# Patient Record
Sex: FEMALE | Race: WHITE | NOT HISPANIC OR LATINO
[De-identification: names, ages, dates, MRNs, and addresses within clinical notes are randomized per-mention and may not be internally consistent; named-entity substitution may affect disease eponyms.]

---

## 2021-02-02 ENCOUNTER — RESULT REVIEW (OUTPATIENT)
Age: 61
End: 2021-02-02

## 2021-02-24 ENCOUNTER — TRANSCRIPTION ENCOUNTER (OUTPATIENT)
Age: 61
End: 2021-02-24

## 2021-02-24 ENCOUNTER — APPOINTMENT (OUTPATIENT)
Dept: HEMATOLOGY ONCOLOGY | Facility: CLINIC | Age: 61
End: 2021-02-24

## 2021-02-24 PROBLEM — Z00.00 ENCOUNTER FOR PREVENTIVE HEALTH EXAMINATION: Status: ACTIVE | Noted: 2021-02-24

## 2021-03-03 ENCOUNTER — NON-APPOINTMENT (OUTPATIENT)
Age: 61
End: 2021-03-03

## 2021-04-13 ENCOUNTER — APPOINTMENT (OUTPATIENT)
Dept: PLASTIC SURGERY | Facility: CLINIC | Age: 61
End: 2021-04-13
Payer: SELF-PAY

## 2021-04-13 VITALS
RESPIRATION RATE: 20 BRPM | OXYGEN SATURATION: 97 % | HEIGHT: 65 IN | SYSTOLIC BLOOD PRESSURE: 140 MMHG | HEART RATE: 90 BPM | DIASTOLIC BLOOD PRESSURE: 92 MMHG | WEIGHT: 179 LBS | BODY MASS INDEX: 29.82 KG/M2

## 2021-04-13 DIAGNOSIS — Z98.86 PERSONAL HISTORY OF BREAST IMPLANT REMOVAL: ICD-10-CM

## 2021-04-13 DIAGNOSIS — Z98.890 OTHER SPECIFIED POSTPROCEDURAL STATES: ICD-10-CM

## 2021-04-13 PROCEDURE — 99202 OFFICE O/P NEW SF 15 MIN: CPT

## 2021-04-21 NOTE — HISTORY OF PRESENT ILLNESS
[FreeTextEntry1] : Ms. RAMYA BAR is a61 year White woman complaining of the aged appearance of her upper eyelids.  She has skin laxity on the upper eyelids, along with puffiness that is prominent and heavy.   There is some skin excess and we discussed a scar along the natural skin supra tarsal fold crease above the eye in the upper eyelid .  The patient is a good candidate for upper blepharoplasty surgery and will benefit from the procedure in the office with local anesthetic. All questions were answered and the risks, benefits, alternatives, limitations, and permanent scars were outlined with her.\par

## 2021-05-21 ENCOUNTER — APPOINTMENT (OUTPATIENT)
Dept: PLASTIC SURGERY | Facility: CLINIC | Age: 61
End: 2021-05-21
Payer: SELF-PAY

## 2021-05-21 VITALS
DIASTOLIC BLOOD PRESSURE: 83 MMHG | HEART RATE: 66 BPM | OXYGEN SATURATION: 98 % | SYSTOLIC BLOOD PRESSURE: 122 MMHG | RESPIRATION RATE: 18 BRPM

## 2021-05-21 VITALS
HEART RATE: 75 BPM | SYSTOLIC BLOOD PRESSURE: 118 MMHG | TEMPERATURE: 97.8 F | RESPIRATION RATE: 18 BRPM | OXYGEN SATURATION: 95 % | DIASTOLIC BLOOD PRESSURE: 77 MMHG

## 2021-05-21 VITALS
HEART RATE: 80 BPM | OXYGEN SATURATION: 95 % | DIASTOLIC BLOOD PRESSURE: 77 MMHG | SYSTOLIC BLOOD PRESSURE: 125 MMHG | RESPIRATION RATE: 16 BRPM

## 2021-05-21 PROCEDURE — 15822 BLEPHAROPLASTY UPPER EYELID: CPT | Mod: 50

## 2021-05-21 NOTE — PROCEDURE
[Nl] : None [FreeTextEntry1] : upper blepharochalasis bilateral [FreeTextEntry2] : upper blepharoplasty bilateral [FreeTextEntry3] : 8 cc 1% lido 1:100 k epi  [FreeTextEntry4] : <1cc  [FreeTextEntry6] :  RAMYA BAR  is complaining of upper eyelid blepharochalasis.   The risks, benefits, alternatives limitations and the permanent scars were outlined with the patient and  she she is prepared for an upper blepharoplasty and has been compliant with the pre-operative instructions.  \par \par Procedure:  Under aseptic conditions with local anesthetic and oral sedation with 5 mg valium and percocet 5/325 x 1  and po cephalexin 500 mg ,  the upper eyelid incision lines were delineated with marks on the supratarsal crease and at the upper limit of redundant eyelid skin  accounting for brow position.  Local anesthetic was infiltrated into the subcutaneous plane  and after ten minutes elapsed time the incision was made and the skin ellipse was excised.  A strip of pre-septal orbicularis oculi was resected and the orbital septum was entered.   Conservative resection of the middle and medial fat pads was accomplished and the stumps of the fat pads were cauterized.  once hemostasis was assured with electrocautery, the wounds were reapproximated with running 6-0 nylon sutures\par

## 2021-05-21 NOTE — ASSESSMENT
[FreeTextEntry1] : RAMYA tolerated the procedure well. RAMYA  has had uncomplicated recovery from surgery and anesthesia The instructions were reviewed in detail with RAMYA. RAMYA will return to the office for a post procedure visit  tuesday for suture removal

## 2021-05-25 ENCOUNTER — APPOINTMENT (OUTPATIENT)
Dept: PLASTIC SURGERY | Facility: CLINIC | Age: 61
End: 2021-05-25
Payer: SELF-PAY

## 2021-05-25 VITALS
SYSTOLIC BLOOD PRESSURE: 134 MMHG | RESPIRATION RATE: 18 BRPM | DIASTOLIC BLOOD PRESSURE: 84 MMHG | OXYGEN SATURATION: 95 % | TEMPERATURE: 98.4 F | HEART RATE: 83 BPM

## 2021-05-25 PROCEDURE — 99024 POSTOP FOLLOW-UP VISIT: CPT

## 2021-05-25 RX ORDER — PANTOPRAZOLE 40 MG/1
40 TABLET, DELAYED RELEASE ORAL
Qty: 90 | Refills: 0 | Status: ACTIVE | COMMUNITY
Start: 2021-02-27

## 2021-05-25 RX ORDER — ATORVASTATIN CALCIUM 40 MG/1
40 TABLET, FILM COATED ORAL
Qty: 90 | Refills: 0 | Status: ACTIVE | COMMUNITY
Start: 2020-12-15

## 2021-05-25 NOTE — ASSESSMENT
[FreeTextEntry1] : pt looks great and is pleased with her appearance . RAMYA  has had uncomplicated recovery from surgery and anesthesia The instructions were reviewed in detail with RAMYA. RAMYA will return to the office for a post procedure visit 3 months for check

## 2021-05-25 NOTE — HISTORY OF PRESENT ILLNESS
[FreeTextEntry1] : 4 d s/p upper bleph surgery well healed  All of RAMYA's incisions are clean dry and healing well.  Her  sutures were removed and areas steri stripped. satish eomi modest swelling and bruising RAMYA  has had uncomplicated recovery from surgery and anesthesia The patient denies fever,chills, shortness of breath, chest pain, calf pain

## 2021-06-24 ENCOUNTER — APPOINTMENT (OUTPATIENT)
Dept: PLASTIC SURGERY | Facility: CLINIC | Age: 61
End: 2021-06-24
Payer: SELF-PAY

## 2021-06-24 VITALS
HEART RATE: 74 BPM | DIASTOLIC BLOOD PRESSURE: 88 MMHG | RESPIRATION RATE: 18 BRPM | TEMPERATURE: 98.7 F | OXYGEN SATURATION: 97 % | SYSTOLIC BLOOD PRESSURE: 128 MMHG

## 2021-06-24 DIAGNOSIS — H02.34 BLEPHAROCHALASIS RIGHT UPPER EYELID: ICD-10-CM

## 2021-06-24 DIAGNOSIS — H02.31 BLEPHAROCHALASIS RIGHT UPPER EYELID: ICD-10-CM

## 2021-06-24 PROCEDURE — 99024 POSTOP FOLLOW-UP VISIT: CPT

## 2021-06-25 PROBLEM — H02.31 BLEPHAROCHALASIS OF BOTH UPPER EYELIDS: Status: ACTIVE | Noted: 2021-04-21

## 2021-06-25 NOTE — HISTORY OF PRESENT ILLNESS
[FreeTextEntry1] : pt is very happy after upper bleph surgery  she is ready to have rhinoplasty and lower bleph surgery  The risks, benefits, alternatives, limitations and the permanent scars were outlined with the patient. All of RAMYA 's questions were answered completely

## 2021-06-25 NOTE — ASSESSMENT
[FreeTextEntry1] : pt will schedule lower bleph and rhinoplasty surgery at her discretion Ms. RAMYA BAR is a61 year White woman complaining of the aged appearance of her lower eyelids. On the lower eyelids, there is some puffiness, along with a tear trough deformity below her bags.  The canthus is mildly lax and will need to be tightened.  There is some skin excess and we discussed a scar just below the lash line on the lower eyelids. We also discussed redraping of the fat in the lower eyelids and canthal support with suture tightening.  The patient is a good candidate for the proposed surgery and will benefit from the procedure. All questions were answered and the risks, benefits, alternatives, limitations, and permanent scars were outlined with her. RAMYA BAR is a 61 year Non- or   female complaining of the appearance of her   nose. she  desires nasal cosmetic surgery. The risks, benefits, alternatives, limitations, and the permanent scars were outlined with the patient for a cosmetic open rhinoplasty. We will address the dorsal bump,  the reduction of nasal width , the nasal tip  reduction and sculpting,  and the nasolabial angle and make adjustments based on realistic desires and principles for rhinoplasty.   All questions were answered.  This patient is an acceptable candidate for the proposed surgery\par

## 2021-10-26 DIAGNOSIS — Z84.1 FAMILY HISTORY OF DISORDERS OF KIDNEY AND URETER: ICD-10-CM

## 2021-10-26 DIAGNOSIS — Z86.19 PERSONAL HISTORY OF OTHER INFECTIOUS AND PARASITIC DISEASES: ICD-10-CM

## 2021-10-26 DIAGNOSIS — Z82.61 FAMILY HISTORY OF ARTHRITIS: ICD-10-CM

## 2021-10-26 DIAGNOSIS — Z80.9 FAMILY HISTORY OF MALIGNANT NEOPLASM, UNSPECIFIED: ICD-10-CM

## 2021-10-26 DIAGNOSIS — Z83.49 FAMILY HISTORY OF OTHER ENDOCRINE, NUTRITIONAL AND METABOLIC DISEASES: ICD-10-CM

## 2021-10-26 DIAGNOSIS — Z86.69 PERSONAL HISTORY OF OTHER DISEASES OF THE NERVOUS SYSTEM AND SENSE ORGANS: ICD-10-CM

## 2021-10-26 DIAGNOSIS — Z78.0 ASYMPTOMATIC MENOPAUSAL STATE: ICD-10-CM

## 2021-10-26 DIAGNOSIS — K21.9 GASTRO-ESOPHAGEAL REFLUX DISEASE W/OUT ESOPHAGITIS: ICD-10-CM

## 2021-10-26 DIAGNOSIS — Z82.49 FAMILY HISTORY OF ISCHEMIC HEART DISEASE AND OTHER DISEASES OF THE CIRCULATORY SYSTEM: ICD-10-CM

## 2021-10-26 DIAGNOSIS — Z83.3 FAMILY HISTORY OF DIABETES MELLITUS: ICD-10-CM

## 2021-10-26 DIAGNOSIS — Z83.511 FAMILY HISTORY OF GLAUCOMA: ICD-10-CM

## 2021-10-27 ENCOUNTER — APPOINTMENT (OUTPATIENT)
Dept: HEMATOLOGY ONCOLOGY | Facility: CLINIC | Age: 61
End: 2021-10-27
Payer: COMMERCIAL

## 2021-10-27 VITALS
TEMPERATURE: 98.6 F | HEIGHT: 65 IN | OXYGEN SATURATION: 98 % | HEART RATE: 83 BPM | RESPIRATION RATE: 18 BRPM | BODY MASS INDEX: 29.99 KG/M2 | DIASTOLIC BLOOD PRESSURE: 73 MMHG | SYSTOLIC BLOOD PRESSURE: 141 MMHG | WEIGHT: 180 LBS

## 2021-10-27 DIAGNOSIS — Z80.3 FAMILY HISTORY OF MALIGNANT NEOPLASM OF BREAST: ICD-10-CM

## 2021-10-27 PROCEDURE — 99205 OFFICE O/P NEW HI 60 MIN: CPT

## 2021-10-27 RX ORDER — CEPHALEXIN 500 MG/1
500 CAPSULE ORAL
Qty: 20 | Refills: 0 | Status: DISCONTINUED | COMMUNITY
Start: 2021-05-18 | End: 2021-10-27

## 2021-10-27 RX ORDER — FUROSEMIDE 20 MG/1
20 TABLET ORAL
Refills: 0 | Status: ACTIVE | COMMUNITY

## 2021-10-27 RX ORDER — CEPHALEXIN 500 MG/1
500 TABLET ORAL
Qty: 20 | Refills: 0 | Status: DISCONTINUED | COMMUNITY
Start: 2021-05-18 | End: 2021-10-27

## 2021-10-27 RX ORDER — PREDNISOLONE ACETATE 10 MG/ML
1 SUSPENSION/ DROPS OPHTHALMIC
Qty: 5 | Refills: 0 | Status: DISCONTINUED | COMMUNITY
Start: 2020-12-11 | End: 2021-10-27

## 2021-11-01 LAB
25(OH)D3 SERPL-MCNC: 28.2 NG/ML
ALBUMIN MFR SERPL ELPH: 60.5 %
ALBUMIN SERPL-MCNC: 4.6 G/DL
ALBUMIN/GLOB SERPL: 1.5 RATIO
ALPHA1 GLOB MFR SERPL ELPH: 4.7 %
ALPHA1 GLOB SERPL ELPH-MCNC: 0.4 G/DL
ALPHA2 GLOB MFR SERPL ELPH: 12.8 %
ALPHA2 GLOB SERPL ELPH-MCNC: 1 G/DL
B-GLOBULIN MFR SERPL ELPH: 11.4 %
B-GLOBULIN SERPL ELPH-MCNC: 0.9 G/DL
DEPRECATED KAPPA LC FREE/LAMBDA SER: 1.26 RATIO
DEPRECATED KAPPA LC FREE/LAMBDA SER: 1.26 RATIO
FOLATE SERPL-MCNC: 5.9 NG/ML
GAMMA GLOB FLD ELPH-MCNC: 0.8 G/DL
GAMMA GLOB MFR SERPL ELPH: 10.6 %
IGA SER QL IEP: 161 MG/DL
IGG SER QL IEP: 816 MG/DL
IGM SER QL IEP: 89 MG/DL
INTERPRETATION SERPL IEP-IMP: NORMAL
KAPPA LC CSF-MCNC: 1.09 MG/DL
KAPPA LC CSF-MCNC: 1.09 MG/DL
KAPPA LC SERPL-MCNC: 1.37 MG/DL
KAPPA LC SERPL-MCNC: 1.37 MG/DL
M PROTEIN SPEC IFE-MCNC: NORMAL
PROT SERPL-MCNC: 7.6 G/DL
PROT SERPL-MCNC: 7.6 G/DL
VIT B12 SERPL-MCNC: 1625 PG/ML

## 2021-11-01 NOTE — HISTORY OF PRESENT ILLNESS
[0 - No Distress] : Distress Level: 0 [de-identified] : This is Cesar is a 61-year-old female who is referred by Dr. Joe Weaver for initial consultation for hematologic evaluation of elevated EBV VCA IgG. \par She was following up with Dr. Weaver for routine visit and was found to be extremely fatigued.  Labs were drawn which revealed an EBV VCA IgG of 750.  She reports having recent infections with herpes zoster, diverticulitis and now EBV recurrence.  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  It was stopped approximately 2 years ago.  She also had an incidence of abscesses that were thought to be autoimmune related while in her 20s.\par RA on Methotrexate for 15 years stopped two years ago. in her 20s had abscess prob autoimmune

## 2021-11-01 NOTE — ASSESSMENT
[FreeTextEntry1] : This appears to be a relatively acute process.\par I will obtain a CBC with differential and a peripheral blood smear review.\par I will obtain a complete hematological workup to include a CMP, LDH, haptoglobin, reticulocyte count, B12/MMA/folic acid levels, TSH, SPEP, SIEP, IgQ,serum free light chains, JAYCEE,  RF, panel, ferritin level, and a Marleni' test.\par At this time she offers no evidence of B symptoms.\par I will obtain abdominal ultrasound to evaluate for any evidence or organomegaly.\par She may require an infectious disease consultation depending on the above w/u.\par The patient will return in 2-3 weeks for followup, review of the above workup, and further recommendations.\par \par Thank you very much for allowing me to participate in the care of this patient. Should you have any questions or concerns please do not hesitate to call me directly.

## 2021-11-12 LAB — METHYLMALONATE SERPL-SCNC: 131 NMOL/L

## 2021-11-15 ENCOUNTER — APPOINTMENT (OUTPATIENT)
Dept: HEMATOLOGY ONCOLOGY | Facility: CLINIC | Age: 61
End: 2021-11-15
Payer: COMMERCIAL

## 2021-11-15 VITALS
HEART RATE: 70 BPM | HEIGHT: 65 IN | TEMPERATURE: 97.7 F | DIASTOLIC BLOOD PRESSURE: 88 MMHG | OXYGEN SATURATION: 98 % | WEIGHT: 180 LBS | BODY MASS INDEX: 29.99 KG/M2 | SYSTOLIC BLOOD PRESSURE: 141 MMHG | RESPIRATION RATE: 18 BRPM

## 2021-11-15 PROCEDURE — 99214 OFFICE O/P EST MOD 30 MIN: CPT

## 2021-12-13 NOTE — DISCUSSION/SUMMARY
[FreeTextEntry1] : CANCER GENETICS CONSULT NOTE – INITIAL VISIT\par \par REASON FOR VISIT:\par Ms. Mechelle Guerrero is a 60-year-old female referred by Dr. Jaskaran Horton for cancer genetic counseling and risk assessment due to a family history of cancer.   \par \par HISTORY OF PRESENT ILLNESS:\par Ms. Guerrero has had no cancer diagnoses other than a basal cell carcinoma removed from her face at age 54.  \par \par RELEVANT MEDICAL AND SURGICAL HISTORY:\par The patient reports a personal history of hypertension and auto-immune disease. She has dense breast and has had one benign breast biopsy.  She had an uterine ablation due to heavy bleeding in her late 40s.  \par \par PAST OB/GYN HISTORY:\par Obstetrical History: \par Age at Menarche: 8\par Menopausal Status:  Post-menopausal.  Menopause was at age early-mid 50s\par Age at First Live Birth: 28\par Oral Contraceptive Use: Yes, for 1 year late 30s \par Hormone Replacement Therapy: No\par Ovaries and uterus remain intact: Yes\par \par CANCER SCREENING HISTORY:  \par Breast: Annual mammogram and annual breast ultrasound \par GYN: Annual GYN visits \par Gastrointestinal: Screening colonoscopy began at age 49, she reports having 2-4 polyps removed\par Skin: Annual skin examinations as needed \par \par SOCIAL HISTORY:\par Ms. Guerrero is a retired .  \par \par FAMILY HISTORY:\par Ms. Guerrero   is of maternal and paternal [place] ancestry.  A detailed family history of cancer was ascertained.  Please see attached pedigree.  \par \par RISK ASSESSMENT:\par Ms. Guerrero has a brother diagnosed with prostate cancer, her mom with renal cancer, her maternal grandfather with colon maternal grandmother with bone and a maternal great aunt with breast cancer.   \par \par The paternal family history presents with breast cancer in a first cousin, esophageal cancer in a first cousin, prostate cancer in paternal uncle and leukemia in a first cousin once removed.   \par \par GENETIC TESTING:  \par It was recommended that Ms. Guerrero   consider germline genetic testing for a hereditary cancer panel of 20 genes including:  BENTLEY, BARD1, BRCA1, BRCA2, BRIP1, CDH1, CHEK2, EPCAM, MLH1, MSH2, MSH6, NBN, NF1, PALB2, PMS2, PTEN, RAD51C, RAD51D, STK11, and TP53 \par \par The risks, benefits and limitations of genetic testing were discussed with Ms. Guerrero.  Possible test results were reviewed with the patient along with associated medical management options.  Genetic implications for family members were reviewed as well as psychosocial factors associated with genetic testing.  Insurance coverage and out of pocket costs were also discussed.  Information regarding the Genetic Information Non-discrimination Act (LOLIS) and potential for genetic discrimination was reviewed.    \par \par Ms. Guerrero consented to pursue genetic testing.  A blood sample was drawn on 2021 and sent to [facility] for analysis.            \par             \par \par PLAN:\par We will contact Ms. Guerrero   and Dr. Horton when we receive the genetic test results.    \par \par We remain available to Ms. Guerrero for questions, comments, or concerns.     \par \par For additional questions please call Cancer Genetics at [phone]. \par \par \par Yulissa Hernandez, MS, CGC, DrPH\par Sr. Genetic Counselor \par [facility] [place] \par [facility]\par \par cc:  \par Dr. Jaskaran Horton\par Mechelle Mujicadon\par \par Enclosure/Addendum:\par Pedigree\par

## 2021-12-13 NOTE — DISCUSSION/SUMMARY
[FreeTextEntry1] : CANCER GENETICS CONSULT NOTE – RESULTS DISCLOSURE \par \par REASON FOR CONSULT:\par Ms. Mechelle Guerrero is a 60-year-old female referred by Dr. Jaskaran Horton for cancer genetic counseling and risk assessment due to a family history of cancer.   \par \par Ms. Guerrero   received cancer genetic counseling on 2021 where a personal and family history of cancer was obtained, and germline genetic testing was ordered.  Ms. Guerrero was informed of her genetic test results over the telephone on 3/3/2021.    \par \par TEST RESULTS:\par Ms. Guerrero   consented to receive genetic testing for a Hereditary Cancer Panel including 20 genes and a specimen was drawn on 2021 and sent to [facility] for analysis.  The results report is dated 2021.     \par \par RESULTS:  No pathogenic variants were identified in the following 20 genes tested:  \par \par One variant of uncertain clinical significance (VUS) was identified in the patient’s MSH6 gene, specifically:  MSH6	c.2146A>G\par   \par ASSESSMENT AND PLAN:\par Given Ms. Guerrero’s genetic test results, her personal history and cancer family history it is unlikely that she has a hereditary cancer susceptibility syndrome.  \par \par Implications for the patient:\par In light of these genetic test results and the patient’s personal and family history it was recommended that she continue to follow the cancer screening guidelines recommended by her healthcare team.  \par \par A variant of uncertain significance (VUS) was detected in the patient’s MSH6 gene.  At this time the available evidence is insufficient to determine the role of this VUS in disease and the clinical significance of this result is uncertain. \par  \par The detection of this VUS does NOT currently change the patient’s medical management. It is NOT recommended at this time that family members use this VUS result for predictive genetic testing or medical management decisions. With more research, a VUS may be reclassified as either disease-causing or benign. The patient was encouraged to contact us every 2-3 years to inquire about any new information for this variant.   \par \par It was discussed that although these genetic test results are reassuring, they do not entirely rule out the possibility of hereditary cancer risk.  Ms. Guerrero’s results do not exclude the possibility of an undetected mutation in the genes tested and do not exclude the possibility of a mutation in either known or unknown cancer susceptibility genes not tested.  \par \par Ms. Guerrero was informed that our knowledge of genetics and inherited cancer conditions is changing rapidly. Testing guidelines, classification and interpretation of genetic test results, and techniques for cancer prevention and screening are constantly being reviewed, updated, and improved. Therefore, it was recommended that Ms. Guerrero    contact our office, every 2 to 3 years, to discuss relevant advances in cancer genetics.  The importance of re-contacting us with updates regarding her personal and family history as well as updates regarding additional cancer genetic test results performed for her and/or family members was emphasized.  Such updates could possibly change our risk assessment and recommendations. \par \par Implications for family members:\par It was recommended that Ms. Guerrero’s family members with cancer consider cancer genetic counseling given that Ms. Guerrero’s negative test results do not exclude them from having a cancer susceptibility genetic variant.  If these individuals are , then their first degree relative may consider genetic counseling with possible testing.  \par \par It is unlikely that Ms. Guerrero’s children would benefit from cancer genetic testing at this time given their mother’s negative genetic test results, unless of course their father has a significant personal or family history of cancer.   \par \par FOLLOW UP:\par •	Ms. Guerrero to contact us with updates regarding her personal and family history and to check-in with us every 2-3 years.  \par •	We would be happy to see Ms. Guerrero’s family members for cancer genetic counseling.  If they do not live locally they may find genetic services through the National Society of Genetic Counselors (www.nsgc.org/findageneticcounselor) or the National Cancer [place] [facility] Gen[facility] Services Directory (www.cancer.gov/cancertopics/genetics/directory).  \par \par We remain available to Ms. Guerrero  for questions, comments, or concerns.  For additional questions please call Cancer Genetics at [phone]. \par \par Yulissa Hernandez, MS, CGC, DrPH\par Sr. Genetic Counselor, Cancer Genetics \par [facility] [place] \par [facility] \par \par cc:\par Dr. Jaskaran Horton\par Mechelle Cesar   \par

## 2022-01-31 ENCOUNTER — APPOINTMENT (OUTPATIENT)
Dept: HEMATOLOGY ONCOLOGY | Facility: CLINIC | Age: 62
End: 2022-01-31
Payer: COMMERCIAL

## 2022-01-31 VITALS
HEIGHT: 65 IN | TEMPERATURE: 98 F | OXYGEN SATURATION: 98 % | WEIGHT: 176 LBS | RESPIRATION RATE: 18 BRPM | HEART RATE: 82 BPM | DIASTOLIC BLOOD PRESSURE: 86 MMHG | BODY MASS INDEX: 29.32 KG/M2 | SYSTOLIC BLOOD PRESSURE: 145 MMHG

## 2022-01-31 DIAGNOSIS — K59.00 CONSTIPATION, UNSPECIFIED: ICD-10-CM

## 2022-01-31 PROCEDURE — 99214 OFFICE O/P EST MOD 30 MIN: CPT

## 2022-01-31 RX ORDER — CHROMIUM 200 MCG
1000 TABLET ORAL
Refills: 0 | Status: ACTIVE | COMMUNITY

## 2022-01-31 RX ORDER — OXYCODONE AND ACETAMINOPHEN 5; 325 MG/1; MG/1
5-325 TABLET ORAL
Qty: 20 | Refills: 0 | Status: DISCONTINUED | COMMUNITY
Start: 2021-05-18 | End: 2022-01-31

## 2022-02-01 PROBLEM — K59.00 CONSTIPATION: Status: ACTIVE | Noted: 2022-02-01

## 2022-02-01 NOTE — HISTORY OF PRESENT ILLNESS
[de-identified] : Mrs. Guerrero is a 61-year-old female who is referred by Dr. Joe Weaver for initial consultation for hematologic evaluation of elevated EBV VCA IgG. \par She was following up with Dr. Weaver for routine visit and was found to be extremely fatigued.  Labs were drawn which revealed an EBV VCA IgG of 750.  She reports having recent infections with herpes zoster, diverticulitis and now EBV recurrence.  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  It was stopped approximately 2 years ago.  She also had an incidence of abscesses that were thought to be autoimmune related while in her 20s.\par RA on Methotrexate for 15 years stopped two years ago. in her 20s had abscess prob autoimmune [de-identified] : She presents for follow up of hepatosplenomegaly and EBV. \par She has history of diverticulitis and has been having recent constipation followed by multple bms, diarrhea and abd pain. She  saw GI in October  who told her to go to liquid diet.

## 2022-02-01 NOTE — REVIEW OF SYSTEMS
[Abdominal Pain] : abdominal pain [Constipation] : constipation [Diarrhea] : diarrhea [FreeTextEntry2] : Significantly improved. [FreeTextEntry9] : Autoimmune arthritis

## 2022-02-01 NOTE — ASSESSMENT
[FreeTextEntry1] : This appears to be a relatively acute process.\par I obtained a CBC with differential and a peripheral blood smear review.\par I obtained a complete hematological workup to include a CMP, LDH, haptoglobin, reticulocyte count, B12/MMA/folic acid levels, TSH, SPEP, SIEP, IgQ,serum free light chains, JAYCEE,  RF, panel, ferritin level, and a Marleni' test.\par The work-up was largely unremarkable.  At this time she offers no evidence of B symptoms.  As a matter fact, she reports that she feels significantly better with improved energy levels and no further abdominal discomfort.\par I obtained abdominal ultrasound to evaluate for any evidence or organomegaly.  This showed mild hepatomegaly with steatosis as well as borderline splenomegaly.  She reports being aware of a prior diagnosis of hepatomegaly and given her overall improvement in symptoms I suspect that the borderline splenomegaly may be secondary to passive congestion.  I have recommended that we monitor this and repeat an abdominal ultrasound 4 to 6 months apart.  I am forwarding her ultrasound results to her gastroenterologist and she knows to follow-up with him.\par I have recommended should her symptoms relapse that she obtain an infectious disease consultation.  She knows that she can call my office for assistance in doing so should this happen.\par Reviewed available labs which were normal. \par We will give her prescription for a new ultrasound  to evaluate for hepatosplenomegaly seen on the last study\par Continue routine, age-appropriate, healthcare maintenance \par History of present illness, review of systems, physical exam and treatment plan reviewed with .\par Office visit in 12 weeks or prn for new or worsening symptoms. \par

## 2022-02-08 ENCOUNTER — RESULT REVIEW (OUTPATIENT)
Age: 62
End: 2022-02-08

## 2022-02-15 ENCOUNTER — RESULT REVIEW (OUTPATIENT)
Age: 62
End: 2022-02-15

## 2022-02-24 ENCOUNTER — NON-APPOINTMENT (OUTPATIENT)
Age: 62
End: 2022-02-24

## 2022-02-24 NOTE — DISCUSSION/SUMMARY
[FreeTextEntry1] : 2/24/2022\par \par Informed patient the MSH6 variant of uncertain significance (VUS) (c.2146A>G; [id number]) previously identified on her genetic testing through [facility] has been reclassified as likely benign. A new report was issued which will be released to the patient via [employer] portal and uploaded into her [facility] chart. Patient should continue screening recommendations discussed previously given her personal and family history.\par \par Parvin Nguyen MS, [facility] [place]par Genetic Counselor\par

## 2022-05-09 ENCOUNTER — APPOINTMENT (OUTPATIENT)
Dept: HEMATOLOGY ONCOLOGY | Facility: CLINIC | Age: 62
End: 2022-05-09
Payer: COMMERCIAL

## 2022-05-09 VITALS
BODY MASS INDEX: 30.16 KG/M2 | SYSTOLIC BLOOD PRESSURE: 131 MMHG | RESPIRATION RATE: 18 BRPM | TEMPERATURE: 98.5 F | HEART RATE: 74 BPM | WEIGHT: 181 LBS | DIASTOLIC BLOOD PRESSURE: 79 MMHG | HEIGHT: 65 IN | OXYGEN SATURATION: 98 %

## 2022-05-09 PROCEDURE — 99214 OFFICE O/P EST MOD 30 MIN: CPT

## 2022-05-09 NOTE — HISTORY OF PRESENT ILLNESS
[de-identified] : Mrs. Guerrero is a 62-year-old female who is referred by Dr. Joe Weaver for initial consultation for hematologic evaluation of elevated EBV VCA IgG. \par She was following up with Dr. Weaver for routine visit and was found to be extremely fatigued.  Labs were drawn which revealed an EBV VCA IgG of 750.  She reports having recent infections with herpes zoster, diverticulitis and now EBV recurrence.  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  It was stopped approximately 2 years ago.  She also had an incidence of abscesses that were thought to be autoimmune related while in her 20s.\par RA on Methotrexate for 15 years stopped two years ago. in her 20s had abscess prob autoimmune [de-identified] : She presents for follow up of hepatosplenomegaly and EBV. \par She has history of diverticulitis and has been having recent constipation followed by multple bms, diarrhea and abd pain. She  saw GI in October  who told her to go to liquid diet.  Has been better overall since last visit.

## 2022-05-09 NOTE — ASSESSMENT
[FreeTextEntry1] : This appears to be a relatively acute process.\par I obtained a CBC with differential and a peripheral blood smear review.\par I obtained a complete hematological workup to include a CMP, LDH, haptoglobin, reticulocyte count, B12/MMA/folic acid levels, TSH, SPEP, SIEP, IgQ,serum free light chains, JAYCEE,  RF, panel, ferritin level, and a Marleni' test.\par The work-up was largely unremarkable.  At this time she offers no evidence of B symptoms.  As a matter fact, she reports that she feels significantly better with improved energy levels and no further abdominal discomfort.\par I obtained abdominal ultrasound to evaluate for any evidence or organomegaly.  This showed mild hepatomegaly with steatosis as well as borderline splenomegaly.  She reports being aware of a prior diagnosis of hepatomegaly and given her overall improvement in symptoms I suspect that the borderline splenomegaly may be secondary to passive congestion.  I have recommended that we monitor this and repeat an abdominal ultrasound 4 to 6 months apart.  I am forwarding her ultrasound results to her gastroenterologist and she knows to follow-up with him.  A repeat ultrasound on 3/8/2022 was stable.\par I have recommended should her symptoms relapse that she obtain an infectious disease consultation.  She knows that she can call my office for assistance in doing so should this happen.\par Reviewed available labs which were normal. \par Continue routine, age-appropriate, healthcare maintenance \par Office visit in 12 weeks or prn for new or worsening symptoms. \par

## 2022-06-13 ENCOUNTER — APPOINTMENT (OUTPATIENT)
Dept: GERIATRICS | Facility: CLINIC | Age: 62
End: 2022-06-13
Payer: COMMERCIAL

## 2022-06-13 VITALS
DIASTOLIC BLOOD PRESSURE: 70 MMHG | SYSTOLIC BLOOD PRESSURE: 130 MMHG | BODY MASS INDEX: 30.95 KG/M2 | TEMPERATURE: 98 F | WEIGHT: 186 LBS | HEART RATE: 95 BPM | OXYGEN SATURATION: 97 %

## 2022-06-13 DIAGNOSIS — Z87.898 PERSONAL HISTORY OF OTHER SPECIFIED CONDITIONS: ICD-10-CM

## 2022-06-13 PROCEDURE — 99203 OFFICE O/P NEW LOW 30 MIN: CPT

## 2022-06-13 RX ORDER — DIAZEPAM 5 MG/1
5 TABLET ORAL
Qty: 1 | Refills: 0 | Status: COMPLETED | COMMUNITY
Start: 2021-05-18 | End: 2022-06-13

## 2022-06-13 RX ORDER — LOSARTAN POTASSIUM 50 MG/1
50 TABLET, FILM COATED ORAL
Qty: 90 | Refills: 0 | Status: COMPLETED | COMMUNITY
Start: 2020-12-16 | End: 2022-06-13

## 2022-06-13 NOTE — REVIEW OF SYSTEMS
[Chest Pain] : no chest pain [Palpitations] : no palpitations [Lower Ext Edema] : no lower extremity edema [Shortness Of Breath] : no shortness of breath [Dyspnea on Exertion] : no dyspnea on exertion [Joint Pain] : joint pain [Joint Stiffness] : joint stiffness [Insomnia] : insomnia [Anxiety] : anxiety [Negative] : Heme/Lymph

## 2022-06-13 NOTE — ASSESSMENT
[FreeTextEntry1] : Patient has been diagnosed with one or more medical conditions for which the use of medical marijuana is appropriate.   PDMP has been checked.  MM is being used to help with anxiety, pain, insomnia.  Patient has been using THC 5 mg chews without side effects can continue with this formulation. \par

## 2022-06-13 NOTE — PHYSICAL EXAM
[No Acute Distress] : no acute distress [Normal Sclera/Conjunctiva] : normal sclera/conjunctiva [EOMI] : extraocular movements intact [No JVD] : no jugular venous distention [No Lymphadenopathy] : no lymphadenopathy [Supple] : supple [No Respiratory Distress] : no respiratory distress  [No Accessory Muscle Use] : no accessory muscle use [Clear to Auscultation] : lungs were clear to auscultation bilaterally [Normal Rate] : normal rate  [Regular Rhythm] : with a regular rhythm [Normal S1, S2] : normal S1 and S2 [No Murmur] : no murmur heard [No Carotid Bruits] : no carotid bruits [No Abdominal Bruit] : a ~M bruit was not heard ~T in the abdomen [No Edema] : there was no peripheral edema [Soft] : abdomen soft [Non Tender] : non-tender [Non-distended] : non-distended [Normal Bowel Sounds] : normal bowel sounds [Grossly Normal Strength/Tone] : grossly normal strength/tone [No Rash] : no rash [Normal Gait] : normal gait [Alert and Oriented x3] : oriented to person, place, and time

## 2022-06-13 NOTE — HISTORY OF PRESENT ILLNESS
[FreeTextEntry1] : Medical cannabis consultation [de-identified] : 62 year old female with a history of psoriatic arthritis, HTN, HLD, NAFLD who presents today for a medical cannabis consultation. \par \par Has been using Curaleaf THC chews (5 mg THC/ 20:1) for anxiety, sleep and pain.  Anxiety since 's cancer diagnosis.  Worse at night, racing mind.  Has sensation of pain in legs that travels up her body and she needs to get out of bed, no able to sleep.  Also with right sided back pain since fall in Feb.  Did not have imaging done.  PCP considering MRI for evaluation since pain is now chronic.  Does not take NSAIDs and Tylenol does not help.\par \par History of psoriatic arthritis. Has been off medication for about 3 years.  Still with pain in hands and right knee pain but manageable. Follows with rheumatologist, Dr Gagnon in [place].\par \par HTN: Currently on losartan 100 mg daily and diltiazem 360 mg daily. Denies palpitations, CP, SOB/TINSLEY, changes in vision, dizziness or syncope.  On Furosemide 20 mg but states for water retention.  Denies history of CHF. \par \par HLD: Currently on Lipitor

## 2022-06-13 NOTE — HEALTH RISK ASSESSMENT
[Never] : Never [No] : In the past 12 months have you used drugs other than those required for medical reasons? No [Any fall with injury in past year] : Patient reported fall with injury in the past year [0] : 2) Feeling down, depressed, or hopeless: Not at all (0) [PHQ-2 Negative - No further assessment needed] : PHQ-2 Negative - No further assessment needed [[id number]] : 0 [Change in mental status noted] : No change in mental status noted [Language] : denies difficulty with language [Behavior] : denies difficulty with behavior [Learning/Retaining New Information] : denies difficulty learning/retaining new information [Handling Complex Tasks] : denies difficulty handling complex tasks [Reasoning] : denies difficulty with reasoning [Spatial Ability and Orientation] : denies difficulty with spatial ability and orientation [None] : None [With Significant Other] : lives with significant other [Retired] : retired [] :  [# Of Children ___] : has [unfilled] children [Feels Safe at Home] : Feels safe at home [Fully functional (bathing, dressing, toileting, transferring, walking, feeding)] : Fully functional (bathing, dressing, toileting, transferring, walking, feeding) [Fully functional (using the telephone, shopping, preparing meals, housekeeping, doing laundry, using] : Fully functional and needs no help or supervision to perform IADLs (using the telephone, shopping, preparing meals, housekeeping, doing laundry, using transportation, managing medications and managing finances) [Reports changes in hearing] : Reports no changes in hearing [Reports changes in vision] : Reports no changes in vision [Reports changes in dental health] : Reports no changes in dental health [Smoke Detector] : smoke detector [Carbon Monoxide Detector] : carbon monoxide detector [Seat Belt] :  uses seat belt

## 2022-09-04 LAB
25(OH)D3 SERPL-MCNC: 29.4 NG/ML
FOLATE SERPL-MCNC: 6.8 NG/ML
VIT B12 SERPL-MCNC: 1063 PG/ML

## 2022-09-14 ENCOUNTER — RESULT REVIEW (OUTPATIENT)
Age: 62
End: 2022-09-14

## 2022-09-14 ENCOUNTER — APPOINTMENT (OUTPATIENT)
Dept: HEMATOLOGY ONCOLOGY | Facility: CLINIC | Age: 62
End: 2022-09-14

## 2022-09-14 VITALS
OXYGEN SATURATION: 98 % | RESPIRATION RATE: 18 BRPM | HEART RATE: 65 BPM | BODY MASS INDEX: 29.99 KG/M2 | HEIGHT: 65 IN | WEIGHT: 180 LBS | SYSTOLIC BLOOD PRESSURE: 143 MMHG | DIASTOLIC BLOOD PRESSURE: 84 MMHG | TEMPERATURE: 98.1 F

## 2022-09-14 PROCEDURE — 99213 OFFICE O/P EST LOW 20 MIN: CPT

## 2022-09-27 LAB — 25(OH)D3 SERPL-MCNC: 37.5 NG/ML

## 2022-09-27 NOTE — HISTORY OF PRESENT ILLNESS
[0 - No Distress] : Distress Level: 0 [de-identified] : Mrs. Guerrero is a 62-year-old female who is referred by Dr. Joe Weaver for initial consultation for hematologic evaluation of elevated EBV VCA IgG. \par She was following up with Dr. Weaver for routine visit and was found to be extremely fatigued.  Labs were drawn which revealed an EBV VCA IgG of 750.  She reports having recent infections with herpes zoster, diverticulitis and now EBV recurrence.  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  It was stopped approximately 2 years ago.  She also had an incidence of abscesses that were thought to be autoimmune related while in her 20s.\par RA on Methotrexate for 15 years stopped two years ago. in her 20s had abscess prob autoimmune [de-identified] : She presents for follow up of hepatosplenomegaly and EBV.  She is being seen by Dr. Yoko Smith allergy immunology for and found to have grass shrimp allergies.  She continues to be followed by Dr. Glover pulmonology for shortness of breath.  She is using albuterol. She denies rash, fever, infections, bleeding, bruising, nausea,vomiting,cough, chest pain, change in BM, headache or dizziness. \par

## 2022-09-27 NOTE — ASSESSMENT
[FreeTextEntry1] : This appears to be a relatively acute process.\par I obtained a CBC with differential and a peripheral blood smear review.\par I obtained a complete hematological workup to include a CMP, LDH, haptoglobin, reticulocyte count, B12/MMA/folic acid levels, TSH, SPEP, SIEP, IgQ,serum free light chains, JAYCEE,  RF, panel, ferritin level, and a Marleni' test.\par The work-up was largely unremarkable.  At this time she offers no evidence of B symptoms.  As a matter fact, she reports that she feels significantly better with improved energy levels and no further abdominal discomfort.\par I obtained abdominal ultrasound to evaluate for any evidence or organomegaly.  This showed mild hepatomegaly with steatosis as well as borderline splenomegaly.  She reports being aware of a prior diagnosis of hepatomegaly and given her overall improvement in symptoms I suspect that the borderline splenomegaly may be secondary to passive congestion.  I have recommended that we monitor this and repeat an abdominal ultrasound 4 to 6 months apart.  I am forwarding her ultrasound results to her gastroenterologist and she knows to follow-up with him.  A repeat ultrasound on 3/8/2022 was stable.\par I have recommended should her symptoms relapse that she obtain an infectious disease consultation.  She knows that she can call my office for assistance in doing so should this happen.\par Continue with allergy immunology \par Continue with pulmonology for shortness of breath\par Reviewed available labs which were normal. \par Office visit in 6 months or prn for new or worsening symptoms. \par Continue routine, age-appropriate, healthcare maintenance \par History of present illness, review of systems, physical exam and treatment plan reviewed with Dr. Leora Rohca\par  \par Office visit in 12 weeks or prn for new or worsening symptoms. \par

## 2022-09-27 NOTE — REVIEW OF SYSTEMS
[SOB on Exertion] : shortness of breath during exertion [Negative] : Constitutional [Fatigue] : no fatigue [FreeTextEntry9] : Autoimmune arthritis

## 2022-12-23 ENCOUNTER — APPOINTMENT (OUTPATIENT)
Dept: INTERNAL MEDICINE | Facility: CLINIC | Age: 62
End: 2022-12-23
Payer: COMMERCIAL

## 2022-12-23 PROCEDURE — 99213 OFFICE O/P EST LOW 20 MIN: CPT

## 2022-12-23 PROCEDURE — 87426 SARSCOV CORONAVIRUS AG IA: CPT | Mod: QW

## 2023-01-30 ENCOUNTER — TRANSCRIPTION ENCOUNTER (OUTPATIENT)
Age: 63
End: 2023-01-30

## 2023-01-31 ENCOUNTER — NON-APPOINTMENT (OUTPATIENT)
Age: 63
End: 2023-01-31

## 2023-02-03 ENCOUNTER — APPOINTMENT (OUTPATIENT)
Dept: INTERNAL MEDICINE | Facility: CLINIC | Age: 63
End: 2023-02-03
Payer: COMMERCIAL

## 2023-02-03 PROCEDURE — 99214 OFFICE O/P EST MOD 30 MIN: CPT

## 2023-03-14 ENCOUNTER — APPOINTMENT (OUTPATIENT)
Dept: HEMATOLOGY ONCOLOGY | Facility: CLINIC | Age: 63
End: 2023-03-14
Payer: COMMERCIAL

## 2023-03-14 ENCOUNTER — RESULT REVIEW (OUTPATIENT)
Age: 63
End: 2023-03-14

## 2023-03-14 VITALS
OXYGEN SATURATION: 97 % | RESPIRATION RATE: 18 BRPM | SYSTOLIC BLOOD PRESSURE: 136 MMHG | WEIGHT: 184 LBS | BODY MASS INDEX: 30.66 KG/M2 | HEART RATE: 80 BPM | HEIGHT: 65 IN | TEMPERATURE: 98.4 F | DIASTOLIC BLOOD PRESSURE: 82 MMHG

## 2023-03-14 PROCEDURE — 99213 OFFICE O/P EST LOW 20 MIN: CPT

## 2023-03-17 RX ORDER — VALSARTAN 160 MG/1
160 TABLET, COATED ORAL
Qty: 30 | Refills: 0 | Status: ACTIVE | COMMUNITY
Start: 2022-10-05

## 2023-03-17 RX ORDER — DILTIAZEM HYDROCHLORIDE 240 MG/1
240 CAPSULE, EXTENDED RELEASE ORAL
Qty: 90 | Refills: 0 | Status: ACTIVE | COMMUNITY
Start: 2023-02-27

## 2023-03-17 NOTE — ASSESSMENT
[FreeTextEntry1] : This appears to be a relatively acute process.\par I obtained a CBC with differential and a peripheral blood smear review.\par I obtained a complete hematological workup to include a CMP, LDH, haptoglobin, reticulocyte count, B12/MMA/folic acid levels, TSH, SPEP, SIEP, IgQ,serum free light chains, JAYCEE,  RF, panel, ferritin level, and a Marleni' test.\par The work-up was largely unremarkable.  At this time she offers no evidence of B symptoms.  As a matter fact, she reports that she feels significantly better with improved energy levels and no further abdominal discomfort.\par I obtained abdominal ultrasound to evaluate for any evidence or organomegaly.  This showed mild hepatomegaly with steatosis as well as borderline splenomegaly.  She reports being aware of a prior diagnosis of hepatomegaly and given her overall improvement in symptoms I suspect that the borderline splenomegaly may be secondary to passive congestion.  I have recommended that we monitor this and repeat an abdominal ultrasound 4 to 6 months apart.  I am forwarding her ultrasound results to her gastroenterologist and she knows to follow-up with him.  A repeat ultrasound on 3/8/2022 was stable.\par I have recommended should her symptoms relapse that she obtain an infectious disease consultation.  She knows that she can call my office for assistance in doing so should this happen.\par Continue with allergy immunology \par Continue with pulmonology for shortness of breath\par Plan:\par She is status post hospitalization for chest pain.  Work-up was negative.\par Reviewed available labs which were normal. \par Repeat abdominal sono for history of hepatomegaly.\par Office visit in 6 weeks after sono or prn for new or worsening symptoms. \par Continue routine, age-appropriate, healthcare maintenance \par History of present illness, review of systems, physical exam and treatment plan reviewed with Dr. Leora Rocha\par  \par \par

## 2023-03-17 NOTE — REVIEW OF SYSTEMS
[SOB on Exertion] : shortness of breath during exertion [Negative] : Allergic/Immunologic [Fatigue] : no fatigue [FreeTextEntry9] : Autoimmune arthritis

## 2023-03-17 NOTE — HISTORY OF PRESENT ILLNESS
[0 - No Distress] : Distress Level: 0 [de-identified] : Mrs. Guerrero is a 62-year-old female who is referred by Dr. Joe Weaver for initial consultation for hematologic evaluation of elevated EBV VCA IgG. \par She was following up with Dr. Weaver for routine visit and was found to be extremely fatigued.  Labs were drawn which revealed an EBV VCA IgG of 750.  She reports having recent infections with herpes zoster, diverticulitis and now EBV recurrence.  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  It was stopped approximately 2 years ago.  She also had an incidence of abscesses that were thought to be autoimmune related while in her 20s.\par RA on Methotrexate for 15 years stopped two years ago. in her 20s had abscess prob autoimmune [de-identified] : She presents for follow up of hepatosplenomegaly and EBV.  She was admitted to [facility] for chest pain on 1/28/23 and discharged on 1/30/23.  Troponins, ECG and stress test were all normal.  She denies rash, fever, infections, bleeding, bruising, nausea,vomiting,cough, chest pain, change in BM, headache or dizziness. \par

## 2023-03-26 ENCOUNTER — RESULT REVIEW (OUTPATIENT)
Age: 63
End: 2023-03-26

## 2023-03-27 ENCOUNTER — NON-APPOINTMENT (OUTPATIENT)
Age: 63
End: 2023-03-27

## 2023-04-04 ENCOUNTER — APPOINTMENT (OUTPATIENT)
Dept: PULMONOLOGY | Facility: CLINIC | Age: 63
End: 2023-04-04
Payer: COMMERCIAL

## 2023-04-04 VITALS
OXYGEN SATURATION: 98 % | HEART RATE: 78 BPM | BODY MASS INDEX: 30.66 KG/M2 | DIASTOLIC BLOOD PRESSURE: 80 MMHG | WEIGHT: 184 LBS | HEIGHT: 65 IN | SYSTOLIC BLOOD PRESSURE: 130 MMHG

## 2023-04-04 DIAGNOSIS — J30.2 OTHER SEASONAL ALLERGIC RHINITIS: ICD-10-CM

## 2023-04-04 DIAGNOSIS — R05.9 COUGH, UNSPECIFIED: ICD-10-CM

## 2023-04-04 PROCEDURE — 99214 OFFICE O/P EST MOD 30 MIN: CPT

## 2023-04-04 RX ORDER — OLOPATADINE HYDROCHLORIDE 2 MG/ML
0.2 SOLUTION OPHTHALMIC DAILY
Qty: 1 | Refills: 5 | Status: ACTIVE | COMMUNITY
Start: 2023-04-04 | End: 1900-01-01

## 2023-04-04 RX ORDER — TRAZODONE HYDROCHLORIDE 50 MG/1
50 TABLET ORAL
Qty: 30 | Refills: 0 | Status: DISCONTINUED | COMMUNITY
Start: 2023-02-03 | End: 2023-04-04

## 2023-04-04 RX ORDER — FLUTICASONE FUROATE AND VILANTEROL TRIFENATATE 100; 25 UG/1; UG/1
100-25 POWDER RESPIRATORY (INHALATION)
Qty: 1 | Refills: 5 | Status: ACTIVE | COMMUNITY
Start: 2023-04-04 | End: 1900-01-01

## 2023-04-04 RX ORDER — LOSARTAN POTASSIUM 100 MG/1
100 TABLET, FILM COATED ORAL
Qty: 90 | Refills: 0 | Status: DISCONTINUED | COMMUNITY
Start: 2021-05-05 | End: 2023-04-04

## 2023-04-04 RX ORDER — AZELASTINE HYDROCHLORIDE 137 UG/1
137 SPRAY, METERED NASAL DAILY
Qty: 1 | Refills: 5 | Status: ACTIVE | COMMUNITY
Start: 2023-04-04 | End: 1900-01-01

## 2023-04-04 RX ORDER — FLUTICASONE FUROATE AND VILANTEROL TRIFENATATE 100; 25 UG/1; UG/1
100-25 POWDER RESPIRATORY (INHALATION)
Qty: 60 | Refills: 0 | Status: DISCONTINUED | COMMUNITY
Start: 2022-10-10 | End: 2023-04-04

## 2023-04-04 NOTE — PHYSICAL EXAM
[No Acute Distress] : no acute distress [Normal Oropharynx] : normal oropharynx [III] : Mallampati Class: III [Normal Appearance] : normal appearance [No Neck Mass] : no neck mass [Normal Rate/Rhythm] : normal rate/rhythm [Normal S1, S2] : normal s1, s2 [No Murmurs] : no murmurs [No Resp Distress] : no resp distress [Clear to Auscultation Bilaterally] : clear to auscultation bilaterally [No Abnormalities] : no abnormalities [No Focal Deficits] : no focal deficits [Oriented x3] : oriented x3 [Normal Affect] : normal affect

## 2023-04-04 NOTE — REVIEW OF SYSTEMS
[Cough] : cough [A.M. Dry Mouth] : a.m. dry mouth [Watery Eyes] : watery eyes [Itchy Eyes] : itchy eyes [Seasonal Allergies] : seasonal allergies [GERD] : gerd [Negative] : Endocrine [Hemoptysis] : no hemoptysis [Sputum] : no sputum [Pleuritic Pain] : no pleuritic pain [SOB on Exertion] : no sob on exertion [Nasal Discharge] : no nasal discharge [Diarrhea] : no diarrhea [Food Intolerance] : no food intolerance

## 2023-04-04 NOTE — DISCUSSION/SUMMARY
[FreeTextEntry1] : 62F with asthma, seasonal allergies here for routine follow-up\par \par #Asthma\par - Symptoms are strongly correlated with seasonal changes. Did not need any inhaler therapy throughout the winter but now will need to resume inhaler therapy\par - I counseled Pt extensively that inhaler steroids have minimal systemic absorption and therefore should not cause long term adverse effects of steroids. She is willing to resume Breo, new Rx sent\par \par #Seasonal allergies\par - She follows with Allergist Dr. Velez. I will reach out to discuss prior allergy testing done and if biologics may benefit patient. Though, given Pt not optimized on inhaler therapy yet, unlikely to qualify for biologics\par - New Rx for eye drops & nasal spray sent\par \par #Dry cough\par - Likely upper airway cough 2/2 postnasal drip & GERD\par - Referred to ENT Dr. Irizarry for laryngoscopic exam\par \par RTC in 3 months for routine follow-up

## 2023-04-04 NOTE — HISTORY OF PRESENT ILLNESS
[Never] : never [TextBox_4] : Ms. Guerrero is a 62F here for management of asthma. She was previously following with Dr. Weaver, last visit 10/2022.\par \par PFTs 5/2021 - normal study\par Current regimen: ventolin PRN; previously on Breo\par Exacerbations: rare\par Triggers: seasonal (Spring/summer), postnasal drip/GERD\par Nocturnal symptoms: dry cough at night\par \par Patient reports that she has been doing well from asthma perspective. She found out the Breo contains steroids and was worried about taking longterm steroids so she self d/c'd it and has not used it throughout the winter without significant worsening of respiratory symptoms. No recent asthma exacerbations or ED visits. She has not needed rescue inhaler since last year. However, now that the seasons are changing, she knows her asthma will get worse and needs a refill of her allergy medications (eye drops and nasal sprays). She also notes that through the winter she has had a new nighttime dry cough that is bothersome. Denies sputum production, hemoptysis, chest pain, pleurisy, PND. She follows with Dr. Velez (allergist) and due for a follow-up appointment soon.

## 2023-04-20 DIAGNOSIS — G47.10 HYPERSOMNIA, UNSPECIFIED: ICD-10-CM

## 2023-04-21 DIAGNOSIS — R16.1 SPLENOMEGALY, NOT ELSEWHERE CLASSIFIED: ICD-10-CM

## 2023-04-21 DIAGNOSIS — J45.30 MILD PERSISTENT ASTHMA, UNCOMPLICATED: ICD-10-CM

## 2023-04-21 DIAGNOSIS — R76.8 OTHER SPECIFIED ABNORMAL IMMUNOLOGICAL FINDINGS IN SERUM: ICD-10-CM

## 2023-04-21 DIAGNOSIS — I10 ESSENTIAL (PRIMARY) HYPERTENSION: ICD-10-CM

## 2023-04-24 ENCOUNTER — APPOINTMENT (OUTPATIENT)
Dept: HEMATOLOGY ONCOLOGY | Facility: CLINIC | Age: 63
End: 2023-04-24
Payer: COMMERCIAL

## 2023-04-24 ENCOUNTER — RESULT REVIEW (OUTPATIENT)
Age: 63
End: 2023-04-24

## 2023-04-24 VITALS
TEMPERATURE: 98.5 F | OXYGEN SATURATION: 96 % | WEIGHT: 182 LBS | BODY MASS INDEX: 30.32 KG/M2 | HEART RATE: 73 BPM | DIASTOLIC BLOOD PRESSURE: 77 MMHG | SYSTOLIC BLOOD PRESSURE: 119 MMHG | RESPIRATION RATE: 18 BRPM | HEIGHT: 65 IN

## 2023-04-24 DIAGNOSIS — Z80.8 FAMILY HISTORY OF MALIGNANT NEOPLASM OF OTHER ORGANS OR SYSTEMS: ICD-10-CM

## 2023-04-24 DIAGNOSIS — Z80.0 FAMILY HISTORY OF MALIGNANT NEOPLASM OF DIGESTIVE ORGANS: ICD-10-CM

## 2023-04-24 DIAGNOSIS — Z80.6 FAMILY HISTORY OF LEUKEMIA: ICD-10-CM

## 2023-04-24 PROCEDURE — 99213 OFFICE O/P EST LOW 20 MIN: CPT | Mod: 25

## 2023-04-24 RX ORDER — ASPIRIN 81 MG/1
81 TABLET, CHEWABLE ORAL
Refills: 0 | Status: ACTIVE | COMMUNITY

## 2023-04-24 NOTE — ASSESSMENT
[FreeTextEntry1] : \par  \par \par Mrs. Guerrero is a 62-year-old female who is referred by Dr. Joe Weaver for initial consultation for hematologic evaluation of elevated EBV VCA IgG. \par She was following up with Dr. Weaver for routine visit and was found to be extremely fatigued.  Labs were drawn which revealed an EBV VCA IgG of 750.  She reports having recent infections with herpes zoster, diverticulitis and now EBV recurrence.  She has a history of psoriatic/ rheumatoid arthritis for which she received methotrexate for 15 years. ( stopped 2020 \par She also had an incidence of abscesses that were thought to be autoimmune related while in her 20s.\par \par u?S 3/27/23 : nl spleen. Hepatromegaly/steatosis. Imptoved\par \par Plan:\par \par consider genetic testing next visit given family h/o\par h/o skin cancer --basal cell --f/u with derm\par colonoscoly --2018\par mammogram due 5/23\par papsmear --2/23

## 2023-04-24 NOTE — HISTORY OF PRESENT ILLNESS
[0 - No Distress] : Distress Level: 0 [de-identified] : Mrs. Guerrero is a 62-year-old female who is referred by Dr. Joe Weaver for initial consultation for hematologic evaluation of elevated EBV VCA IgG. \par She was following up with Dr. Weaver for routine visit and was found to be extremely fatigued.  Labs were drawn which revealed an EBV VCA IgG of 750.  She reports having recent infections with herpes zoster, diverticulitis and now EBV recurrence.  She has a history of psoriatic/ rheumatoid arthritis for which she received methotrexate for 15 years. ( stopped 2020 \par She also had an incidence of abscesses that were thought to be autoimmune related while in her 20s.\par  [de-identified] : She presents for follow up of hepatosplenomegaly and EBV.  She was admitted to [facility] for chest pain on 1/28/23 and discharged on 1/30/23.

## 2023-04-26 ENCOUNTER — APPOINTMENT (OUTPATIENT)
Dept: HEMATOLOGY ONCOLOGY | Facility: CLINIC | Age: 63
End: 2023-04-26

## 2023-04-28 ENCOUNTER — APPOINTMENT (OUTPATIENT)
Dept: INTERNAL MEDICINE | Facility: CLINIC | Age: 63
End: 2023-04-28
Payer: COMMERCIAL

## 2023-04-28 PROCEDURE — 99396 PREV VISIT EST AGE 40-64: CPT

## 2023-04-28 PROCEDURE — 36415 COLL VENOUS BLD VENIPUNCTURE: CPT

## 2023-05-12 ENCOUNTER — NON-APPOINTMENT (OUTPATIENT)
Age: 63
End: 2023-05-12

## 2023-06-12 ENCOUNTER — APPOINTMENT (OUTPATIENT)
Dept: PULMONOLOGY | Facility: CLINIC | Age: 63
End: 2023-06-12

## 2023-06-15 ENCOUNTER — APPOINTMENT (OUTPATIENT)
Dept: GERIATRICS | Facility: CLINIC | Age: 63
End: 2023-06-15
Payer: COMMERCIAL

## 2023-06-15 VITALS
BODY MASS INDEX: 29.99 KG/M2 | DIASTOLIC BLOOD PRESSURE: 70 MMHG | SYSTOLIC BLOOD PRESSURE: 100 MMHG | WEIGHT: 180 LBS | HEIGHT: 65 IN | HEART RATE: 78 BPM | OXYGEN SATURATION: 98 %

## 2023-06-15 DIAGNOSIS — F51.05 ANXIETY DISORDER, UNSPECIFIED: ICD-10-CM

## 2023-06-15 DIAGNOSIS — F41.9 ANXIETY DISORDER, UNSPECIFIED: ICD-10-CM

## 2023-06-15 PROCEDURE — 99213 OFFICE O/P EST LOW 20 MIN: CPT

## 2023-06-15 RX ORDER — DILTIAZEM HYDROCHLORIDE 180 MG/1
180 CAPSULE, EXTENDED RELEASE ORAL
Qty: 30 | Refills: 0 | Status: COMPLETED | COMMUNITY
Start: 2021-05-10 | End: 2023-06-15

## 2023-06-15 NOTE — ASSESSMENT
[FreeTextEntry1] : Patient has been diagnosed with one or more medical conditions for which the use of medical marijuana is appropriate.   PDMP has been checked.  MM is being used to help with anxiety, pain, insomnia.  Patient has been using THC 5 mg chews without side effects can continue with this formulation. Certification completed. \par

## 2023-06-15 NOTE — HISTORY OF PRESENT ILLNESS
[FreeTextEntry1] : Medical Cannabis Recertification [de-identified] : 62 year old female with a history of psoriatic arthritis, HTN, HLD, NAFLD who presents today for a medical cannabis recertification. \par \par Has been using Curaleaf THC chews (5 mg THC/ 20:1) for anxiety, sleep and pain.  Notices when she wakes up having less pain.  No side effects. \par \par HTN: Currently on valsartan 160 mg daily and diltiazem 240 mg daily. Denies palpitations, CP, SOB/TINSLEY, changes in vision, dizziness or syncope.  On Furosemide 20 mg but states for water retention.  Denies history of CHF. \par \par HLD: Currently on Lipitor

## 2023-06-15 NOTE — PHYSICAL EXAM
[No Acute Distress] : no acute distress [No JVD] : no jugular venous distention [Supple] : supple [No Respiratory Distress] : no respiratory distress  [No Accessory Muscle Use] : no accessory muscle use [Clear to Auscultation] : lungs were clear to auscultation bilaterally [Normal Rate] : normal rate  [Regular Rhythm] : with a regular rhythm [No Carotid Bruits] : no carotid bruits [Normal Gait] : normal gait [Alert and Oriented x3] : oriented to person, place, and time

## 2023-06-26 ENCOUNTER — APPOINTMENT (OUTPATIENT)
Dept: PULMONOLOGY | Facility: CLINIC | Age: 63
End: 2023-06-26
Payer: COMMERCIAL

## 2023-06-26 VITALS
DIASTOLIC BLOOD PRESSURE: 80 MMHG | OXYGEN SATURATION: 96 % | SYSTOLIC BLOOD PRESSURE: 124 MMHG | HEART RATE: 81 BPM | HEIGHT: 65 IN | BODY MASS INDEX: 30.99 KG/M2 | WEIGHT: 186 LBS | TEMPERATURE: 97.9 F

## 2023-06-26 PROCEDURE — 99213 OFFICE O/P EST LOW 20 MIN: CPT

## 2023-06-26 NOTE — REVIEW OF SYSTEMS
[Cough] : no cough [Hemoptysis] : no hemoptysis [Sputum] : no sputum [Pleuritic Pain] : no pleuritic pain [A.M. Dry Mouth] : no a.m. dry mouth [SOB on Exertion] : no sob on exertion [Watery Eyes] : watery eyes [Itchy Eyes] : itchy eyes [Seasonal Allergies] : seasonal allergies [Nasal Discharge] : no nasal discharge [GERD] : gerd [Diarrhea] : no diarrhea [Food Intolerance] : no food intolerance [Negative] : Endocrine

## 2023-06-26 NOTE — DISCUSSION/SUMMARY
[FreeTextEntry1] : 62F with asthma, seasonal allergies here for routine follow-up\par \par #Asthma\par - Remains off maintenance inhalers at this time with minimal symptoms\par - Will follow-up at Fall/Winter time to assess need to resume inhalers\par - Continue eye drops & nasal sprays for seasonal allergy treatment\par \par #Snoring\par - Pt had HST which showed no e/o GABRIELA. Pt questioned whether in-lab study is warranted. Given Pt's symptoms of daytime somnolence are mild, likely would not benefit from in-lab study.\par \par RTC in 3 months for routine follow-up

## 2023-06-26 NOTE — HISTORY OF PRESENT ILLNESS
[Never] : never [TextBox_4] : Ms. Guerrero is a 62F here for management of asthma. She was previously following with Dr. Weaver, last visit 10/2022.\par \par PFTs 5/2021 - normal study\par Current regimen: ventolin PRN; previously on Breo\par Exacerbations: rare\par Triggers: seasonal (Spring/summer), postnasal drip/GERD\par Nocturnal symptoms: dry cough at night\par \par Patient reports that she has been doing well from asthma perspective. She found out the Breo contains steroids and was worried about taking longterm steroids so she self d/c'd it and has not used it throughout the winter without significant worsening of respiratory symptoms. No recent asthma exacerbations or ED visits. She has not needed rescue inhaler since last year. However, now that the seasons are changing, she knows her asthma will get worse and needs a refill of her allergy medications (eye drops and nasal sprays). She also notes that through the winter she has had a new nighttime dry cough that is bothersome. Denies sputum production, hemoptysis, chest pain, pleurisy, PND. She follows with Dr. Velez (allergist) and due for a follow-up appointment soon.\par \par 6/2023\par Ms. Guerrero returns today for follow-up asthma. Doing well since last visit. She remains off Breo and reports breathing is stable. She had HST done since last visit which showed no e/o GABRIELA.

## 2023-08-07 ENCOUNTER — APPOINTMENT (OUTPATIENT)
Dept: RHEUMATOLOGY | Facility: CLINIC | Age: 63
End: 2023-08-07
Payer: COMMERCIAL

## 2023-08-07 VITALS
HEART RATE: 80 BPM | OXYGEN SATURATION: 95 % | HEIGHT: 65 IN | SYSTOLIC BLOOD PRESSURE: 110 MMHG | WEIGHT: 184 LBS | BODY MASS INDEX: 30.66 KG/M2 | DIASTOLIC BLOOD PRESSURE: 80 MMHG

## 2023-08-07 PROCEDURE — 99204 OFFICE O/P NEW MOD 45 MIN: CPT

## 2023-08-07 NOTE — HISTORY OF PRESENT ILLNESS
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 after the patient started MTX treatment PsA-Pso enter in remission off MTX since 2020 as per records on treatment for 12-10 years with controlled disease  Today: patient feels well denied synovitis, dactylitis no significant AM stiffness no major skin psoriasis rash  Records: 2020 description of psoriatic arthritis MTX treatment controlled disease normal esr, crp negative JAYCEE

## 2023-08-07 NOTE — PHYSICAL EXAM
[General Appearance - Alert] : alert [General Appearance - In No Acute Distress] : in no acute distress [Sclera] : the sclera and conjunctiva were normal [] : no respiratory distress [Auscultation Breath Sounds / Voice Sounds] : lungs were clear to auscultation bilaterally [Heart Rate And Rhythm] : heart rate was normal and rhythm regular [Heart Sounds] : normal S1 and S2 [Nail Clubbing] : no clubbing  or cyanosis of the fingernails [Musculoskeletal - Swelling] : no joint swelling seen [Motor Tone] : muscle strength and tone were normal [No Focal Deficits] : no focal deficits [Oriented To Time, Place, And Person] : oriented to person, place, and time

## 2023-08-08 LAB
ALBUMIN SERPL ELPH-MCNC: 5.2 G/DL
ALP BLD-CCNC: 108 U/L
ALT SERPL-CCNC: 49 U/L
ANION GAP SERPL CALC-SCNC: 15 MMOL/L
AST SERPL-CCNC: 28 U/L
BILIRUB SERPL-MCNC: 0.3 MG/DL
BUN SERPL-MCNC: 18 MG/DL
CALCIUM SERPL-MCNC: 10.1 MG/DL
CCP AB SER IA-ACNC: <8 UNITS
CHLORIDE SERPL-SCNC: 102 MMOL/L
CO2 SERPL-SCNC: 25 MMOL/L
CREAT SERPL-MCNC: 0.82 MG/DL
EGFR: 80 ML/MIN/1.73M2
GLUCOSE SERPL-MCNC: 96 MG/DL
POTASSIUM SERPL-SCNC: 4.3 MMOL/L
PROT SERPL-MCNC: 7.9 G/DL
RF+CCP IGG SER-IMP: NEGATIVE
RHEUMATOID FACT SER QL: 13 IU/ML
SODIUM SERPL-SCNC: 142 MMOL/L
URATE SERPL-MCNC: 3.4 MG/DL

## 2023-10-23 ENCOUNTER — APPOINTMENT (OUTPATIENT)
Dept: PULMONOLOGY | Facility: CLINIC | Age: 63
End: 2023-10-23
Payer: COMMERCIAL

## 2023-10-23 VITALS
SYSTOLIC BLOOD PRESSURE: 112 MMHG | HEIGHT: 65 IN | WEIGHT: 180 LBS | OXYGEN SATURATION: 97 % | DIASTOLIC BLOOD PRESSURE: 74 MMHG | HEART RATE: 74 BPM | BODY MASS INDEX: 29.99 KG/M2

## 2023-10-23 DIAGNOSIS — J45.909 UNSPECIFIED ASTHMA, UNCOMPLICATED: ICD-10-CM

## 2023-10-23 PROCEDURE — 99213 OFFICE O/P EST LOW 20 MIN: CPT

## 2023-10-23 RX ORDER — ALBUTEROL SULFATE 90 UG/1
108 (90 BASE) INHALANT RESPIRATORY (INHALATION)
Qty: 1 | Refills: 5 | Status: ACTIVE | COMMUNITY
Start: 2023-04-04 | End: 1900-01-01

## 2023-11-14 ENCOUNTER — APPOINTMENT (OUTPATIENT)
Dept: HEPATOLOGY | Facility: CLINIC | Age: 63
End: 2023-11-14
Payer: COMMERCIAL

## 2023-11-14 VITALS
HEART RATE: 82 BPM | SYSTOLIC BLOOD PRESSURE: 128 MMHG | WEIGHT: 180 LBS | BODY MASS INDEX: 29.99 KG/M2 | HEIGHT: 65 IN | DIASTOLIC BLOOD PRESSURE: 82 MMHG

## 2023-11-14 DIAGNOSIS — R10.9 UNSPECIFIED ABDOMINAL PAIN: ICD-10-CM

## 2023-11-14 PROCEDURE — 99204 OFFICE O/P NEW MOD 45 MIN: CPT

## 2023-11-20 ENCOUNTER — LABORATORY RESULT (OUTPATIENT)
Age: 63
End: 2023-11-20

## 2023-11-22 LAB
A1AT SERPL-MCNC: 157 MG/DL
ALBUMIN SERPL ELPH-MCNC: 5.1 G/DL
ALP BLD-CCNC: 110 U/L
ALT SERPL-CCNC: 46 U/L
ANION GAP SERPL CALC-SCNC: 15 MMOL/L
AST SERPL-CCNC: 27 U/L
BILIRUB SERPL-MCNC: 0.3 MG/DL
BUN SERPL-MCNC: 14 MG/DL
CALCIUM SERPL-MCNC: 9.7 MG/DL
CERULOPLASMIN SERPL-MCNC: 27 MG/DL
CHLORIDE SERPL-SCNC: 103 MMOL/L
CO2 SERPL-SCNC: 24 MMOL/L
CREAT SERPL-MCNC: 0.86 MG/DL
EGFR: 75 ML/MIN/1.73M2
FERRITIN SERPL-MCNC: 94 NG/ML
GGT SERPL-CCNC: 30 U/L
GLUCOSE SERPL-MCNC: 119 MG/DL
HCT VFR BLD CALC: 46.1 %
HGB BLD-MCNC: 14.9 G/DL
IGA SER QL IEP: 185 MG/DL
IGG SER QL IEP: 626 MG/DL
IGM SER QL IEP: 103 MG/DL
MCHC RBC-ENTMCNC: 28.9 PG
MCHC RBC-ENTMCNC: 32.3 GM/DL
MCV RBC AUTO: 89.5 FL
PLATELET # BLD AUTO: 265 K/UL
POTASSIUM SERPL-SCNC: 4.1 MMOL/L
PROT SERPL-MCNC: 7.4 G/DL
RBC # BLD: 5.15 M/UL
RBC # FLD: 12.6 %
SODIUM SERPL-SCNC: 142 MMOL/L
WBC # FLD AUTO: 5.99 K/UL

## 2024-01-21 LAB
ANA SER IF-ACNC: NEGATIVE
TTG IGA SER IA-ACNC: <1.2 U/ML
TTG IGA SER-ACNC: NEGATIVE
TTG IGG SER IA-ACNC: <1.2 U/ML
TTG IGG SER IA-ACNC: NEGATIVE

## 2024-01-22 ENCOUNTER — NON-APPOINTMENT (OUTPATIENT)
Age: 64
End: 2024-01-22

## 2024-02-12 ENCOUNTER — APPOINTMENT (OUTPATIENT)
Dept: RHEUMATOLOGY | Facility: CLINIC | Age: 64
End: 2024-02-12
Payer: COMMERCIAL

## 2024-02-12 VITALS
HEIGHT: 65 IN | OXYGEN SATURATION: 98 % | SYSTOLIC BLOOD PRESSURE: 122 MMHG | BODY MASS INDEX: 29.99 KG/M2 | WEIGHT: 180 LBS | HEART RATE: 80 BPM | DIASTOLIC BLOOD PRESSURE: 74 MMHG

## 2024-02-12 PROCEDURE — 99214 OFFICE O/P EST MOD 30 MIN: CPT

## 2024-02-12 NOTE — END OF VISIT
Chief complaint:   Chief Complaint   Patient presents with   • Cough     Congestion, cough, sinus pressure x 1 week       Vitals:  Visit Vitals  /82 (BP Location: RUE - Right upper extremity, Patient Position: Sitting, Cuff Size: Regular)   Pulse 65   Temp 98.1 °F (36.7 °C) (Temporal)   Resp 16   Ht 5' 1\" (1.549 m)   Wt 77.7 kg (171 lb 3.2 oz)   LMP 01/14/2010 (Exact Date)   SpO2 99%   BMI 32.35 kg/m²       HISTORY OF PRESENT ILLNESS     59-year-old female presents to clinic today complaining of cough with chest and sinus congestion.  Symptoms started over a week ago.  She is otherwise healthy and has no other associated issues.  She has been taking some OTC cold preps which have been minimally effective thus far.  She is otherwise healthy and has no other associated issues.      Other significant problems:  Patient Active Problem List    Diagnosis Date Noted   • COVID-19 virus infection 12/02/2021     Priority: Low   • DESIRAE on CPAP 12/22/2020     Priority: Low   • Unformed visual hallucinations 12/15/2020     Priority: Low   • Irritable bowel syndrome with both constipation and diarrhea 10/27/2020     Priority: Low   • Gastroesophageal reflux disease 10/27/2020     Priority: Low   • Constipation 02/27/2020     Priority: Low   • Vitamin D deficiency 01/06/2020     Priority: Low   • Type 2 diabetes with complication (CMS/[facility]) 01/06/2020     Priority: Low   • Stage 3 chronic kidney disease (CMS/[facility]) 01/06/2020     Priority: Low   • Class 1 obesity 01/06/2020     Priority: Low   • Benign neoplasm of left adrenal gland 02/20/2019     Priority: Low   • Multinodular goiter 10/03/2018     Priority: Low   • Recurrent seizures (CMS/[facility]) 09/23/2018     Priority: Low     EEG on 5/23/2018 was normal for awake drowsy and sleep states.  EEG on 1/15/2018 This is a normal EEG for the awake only state.   No epileptiform activity is seen.  MRI of the brain 6/2/18   Impression   1. No evidence of acute infarction  2. Stable  [Time Spent: ___ minutes] : I have spent [unfilled] minutes of time on the encounter. appearance of susceptibility artifact near genu of internal capsule on the left consistent with sequela of prior hematoma with residual hemosiderin deposition, unchanged.  3. 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  4. Minimal changes of chronic small vessel ischemic disease  5. No new bleed or mass.       1/14/18: MR angiogram of the head and neck  IMPRESSION:   1. Right middle cerebral artery occlusion. This is consistent with the  patient's history of remote right MCA territory infarct.     IMPRESSION:   1.  Normal extracranial carotid and vertebral MRA, no evidence of  high-grade stenosis, dissection, or occlusion.     • Mild intermittent asthma without complication 07/10/2018     Priority: Low   • History of TIA (transient ischemic attack) and stroke 03/14/2018     Priority: Low     history of hypertension, fairly controlled diabetes, hyperlipidemia, right parietal hemorrhagic stroke in 2016.     • Anxiety disorder due to medical condition 03/14/2018     Priority: Low   • Hypertension 01/24/2018     Priority: Low   • Breast mass, right 04/09/2015     Priority: Low   • Hyperlipidemia with target low density lipoprotein (LDL) cholesterol less than 100 mg/dL 04/09/2015     Priority: Low       PAST MEDICAL, FAMILY AND SOCIAL HISTORY     Medications:  Current Outpatient Medications   Medication   • cloNIDine (CATAPRES) 0.3 MG tablet   • docusate sodium (COLACE) 100 MG capsule   • semaglutide,0.25 or 0.5 mg/DOSE, (Ozempic, 0.25 or 0.5 MG/DOSE,) (1.34 mg/ml) 0.25 or 0.5 MG/DOSE injection   • Dexilant 60 MG capsule   • levetiracetam (KEPPRA) 1000 MG tablet   • Insulin Pen Needle (BD Pen Needle Mery U/F) 32G X 4 MM Misc   • escitalopram (LEXAPRO) 20 MG tablet   • insulin glargine (Lantus SoloStar) 100 UNIT/ML pen-injector   • HumaLOG KwikPen 100 UNIT/ML pen-injector   • clopidogrel (PLAVIX) 75 MG tablet   • atorvastatin (LIPITOR) 20 MG tablet   • carvedilol (COREG) 25 MG tablet    • losartan (COZAAR) 100 MG tablet   • Breo Ellipta 200-25 MCG/INH inhaler   • albuterol 108 (90 Base) MCG/ACT inhaler   • Lancets (OneTouch Delica Plus [id number]) Misc   • meclizine (ANTIVERT) 25 MG tablet   • montelukast (SINGULAIR) 10 MG tablet   • amitriptyline (ELAVIL) 10 MG tablet   • blood glucose test strip   • azelastine (ASTELIN) 0.1 % nasal spray   • pantoprazole (PROTONIX) 40 MG tablet   • Spacer/Aero-Holding Chambers Device   • fluticasone (Flonase) 50 MCG/ACT nasal spray   • Cholecalciferol 125 mcg (5,000 units) capsule   • ferrous sulfate 325 (65 FE) MG tablet   • Spacer/Aero Chamber Mouthpiece Misc   • Ascorbic Acid (VITAMIN C PO)   • doxycycline monohydrate (ADOXA) 100 MG tablet   • benzonatate (TESSALON PERLES) 200 MG capsule   • promethazine-dextromethorphan (PROMETHAZINE-DM) 6.25-15 MG/5ML syrup   • methylPREDNISolone (MEDROL DOSEPAK) 4 MG tablet   • hydroCORTisone (CORTIZONE) 2.5 % cream     No current facility-administered medications for this visit.       Allergies:  ALLERGIES:   Allergen Reactions   • Latex   (Environmental) PRURITUS   • Seasonal HEADACHES   • Sulfa Antibiotics PRURITUS   • Sulfasalazine RASH       Past Medical  History/Surgeries:  Past Medical History:   Diagnosis Date   • Acute pain of left shoulder 11/13/2018   • Anxiety disorder due to medical condition 3/14/2018   • Asthma    • Benign neoplasm of left adrenal gland 2/20/2019   • Chronic constipation    • Essential (primary) hypertension    • Gastroesophageal reflux disease    • Head ache    • Hypercholesterolemia    • DESIRAE on CPAP 12/22/2020   • Seizure (CMS/[facility])    • Seizure disorder as sequela of cerebrovascular accident (CMS/[facility]) 11/24/2016    discharged by neurology   • Sleep apnea    • Stroke (CMS/[facility]) 11/24/2016    intracranial hemorrhage   • Thyroid nodule 10/3/2018   • Type 2 diabetes mellitus with stage 3 chronic kidney disease, with long-term current use of insulin (CMS/[facility]) 1998    insulin started 2017        Past Surgical History:   Procedure Laterality Date   • Bunionectomy Left 01/05/2018    Dr. Fallon   • Carpal tunnel release Bilateral    • Colonoscopy     • Colonoscopy  09/11/2020   • Egd     • Egd N/A 03/10/2021    normal EGD and biopsies Dr Adenike Davila   • Eye surgery      daylin cataracts   • Ir venogram adrenal selective bilat s&i Bilateral 01/08/2019   • Laparoscopy adrenalectomy Left 03/07/2019    normal adrenal gland, no pathology       Family History:  Family History   Problem Relation Age of Onset   • Diabetes Mother    • Heart disease Mother    • Kidney disease Mother    • Hypertension Mother    • Myocardial Infarction Mother    • Diabetes Sister    • Hypertension Sister    • Diabetes Brother    • Hypertension Brother    • Diabetes Sister    • Asthma Son    • Allergic Rhinitis Neg Hx    • Eczema Neg Hx    • Urticaria Neg Hx        Social History:  Social History     Tobacco Use   • Smoking status: Never Smoker   • Smokeless tobacco: Never Used   Substance Use Topics   • Alcohol use: No     Comment: last drink in 3 years       REVIEW OF SYSTEMS     Review of Systems   HENT: Positive for congestion, postnasal drip, rhinorrhea, sinus pressure and sinus pain.    Respiratory: Positive for cough and chest tightness.    All other systems reviewed and are negative.      PHYSICAL EXAM     Physical Exam  Vitals reviewed.   Constitutional:       Appearance: Normal appearance.   HENT:      Head: Normocephalic and atraumatic.      Right Ear: Tympanic membrane normal.      Left Ear: Tympanic membrane normal.      Nose: Congestion and rhinorrhea present.      Mouth/Throat:      Mouth: Mucous membranes are moist.      Pharynx: Oropharynx is clear.      Neck: Normal range of motion and neck supple.   Eyes:      Extraocular Movements: Extraocular movements intact.      Conjunctiva/sclera: Conjunctivae normal.      Pupils: Pupils are equal, round, and reactive to light.   Cardiovascular:      Rate and Rhythm: Normal rate  and regular rhythm.   Pulmonary:      Effort: Pulmonary effort is normal.      Comments: Good respiratory effort.  Positive cough.  Cough is harsh and rhonchorous.  Scattered fine wheezes.  No accessory muscle use or nasal flaring  Musculoskeletal:         General: Normal range of motion.   Skin:     General: Skin is warm and dry.   Neurological:      General: No focal deficit present.      Mental Status: She is alert and oriented to person, place, and time.   Psychiatric:         Mood and Affect: Mood normal.         Behavior: Behavior normal.         Thought Content: Thought content normal.         Judgment: Judgment normal.         ASSESSMENT/PLAN     Sinusitis  Bronchitis      #1 Medications:  Promethazine DM, Tessalon, Medrol Dosepak, doxycycline  #2 Follow up with Primary Care Provider for any worsening or changing symptoms.  #3 Follow up with Emergency Department for any worsening or changing symptoms.  #4 Maintain good hydration.  #5 Patient and family members understand the assessment and plan today and had no further questions.      ANGEL VargasC      Supervising Physician:  Dr. Giuliana SALAS data to display  This includes pre-charting, chart review and documenting.

## 2024-02-12 NOTE — HISTORY OF PRESENT ILLNESS
[___ Month(s) Ago] : [unfilled] month(s) ago [FreeTextEntry1] : doing well no dactylitis no synovitis no psoriatic plaques arthralgias episodic

## 2024-03-12 ENCOUNTER — APPOINTMENT (OUTPATIENT)
Dept: HEPATOLOGY | Facility: CLINIC | Age: 64
End: 2024-03-12
Payer: COMMERCIAL

## 2024-03-12 VITALS
HEIGHT: 65 IN | DIASTOLIC BLOOD PRESSURE: 79 MMHG | HEART RATE: 92 BPM | SYSTOLIC BLOOD PRESSURE: 118 MMHG | BODY MASS INDEX: 30.49 KG/M2 | WEIGHT: 183 LBS

## 2024-03-12 DIAGNOSIS — R79.89 OTHER SPECIFIED ABNORMAL FINDINGS OF BLOOD CHEMISTRY: ICD-10-CM

## 2024-03-12 DIAGNOSIS — R16.2 HEPATOMEGALY WITH SPLENOMEGALY, NOT ELSEWHERE CLASSIFIED: ICD-10-CM

## 2024-03-12 PROCEDURE — 99213 OFFICE O/P EST LOW 20 MIN: CPT

## 2024-03-14 NOTE — END OF VISIT
[FreeTextEntry3] : Patient does not have any new complaint Physical exam is unchanged Soft abdomen with no tenderness, organomegaly or ascites Clear lungs with no rhonchi Regular heartbeats Labs were reviewed and discussed with patient Impression/suggestion Patient with fatty liver based on sonogram and mild elevation of ALT No biochemical evidence of advanced fibrosis FibroScan will be done when feasible Diet weight loss and exercise were discussed

## 2024-03-14 NOTE — HISTORY OF PRESENT ILLNESS
[de-identified] : Elevated liver enzymes Seronegative spondylarthritis History of treatment with methotrexate for 10-15 yr Negative JAYCEE Normal ESR and CRP Mild elevation of liver enzymes Fatty liver on ultrasound but no splenomegaly Hypertension and psoriatic arthritis Colonoscopy by Dr. Granger 8-2023  Bouts of abdominal pain with acute pain, sweating, vomiting, work up negative for porphyria   Clear relation with any specific She has been taking this for the past several years with some recent worsening. No weight loss. Abdominal pain she has good appetite No chest pain, shortness of breath or palpitation No pulmonary symptoms No  symptoms She is constipated at times  No significant history of drinking or smoking No use of drugs or blood transfusion No family history of GI diseases or malignancies   Vital signs are stable No scleral icterus No evidence of muscle wasting Clear lungs with no rhonchi or crackle Regular heartbeats with no murmur Soft abdomen with no tenderness, organomegaly or hernia No edema of extremities Awake alert and oriented  [FreeTextEntry1] : She was seen by Rheumatology recently. No active disease.

## 2024-03-15 LAB
ALBUMIN SERPL ELPH-MCNC: 5.1 G/DL
ALP BLD-CCNC: 113 U/L
ALT SERPL-CCNC: 38 U/L
ANION GAP SERPL CALC-SCNC: 11 MMOL/L
AST SERPL-CCNC: 19 U/L
BILIRUB SERPL-MCNC: 0.4 MG/DL
BUN SERPL-MCNC: 15 MG/DL
CALCIUM SERPL-MCNC: 10.2 MG/DL
CHLORIDE SERPL-SCNC: 103 MMOL/L
CO2 SERPL-SCNC: 29 MMOL/L
CREAT SERPL-MCNC: 0.85 MG/DL
EGFR: 76 ML/MIN/1.73M2
GLUCOSE SERPL-MCNC: 92 MG/DL
HBV SURFACE AB SERPL IA-ACNC: 690.1 MIU/ML
HCT VFR BLD CALC: 46.2 %
HEPATITIS A IGG ANTIBODY: REACTIVE
HGB BLD-MCNC: 14.7 G/DL
MCHC RBC-ENTMCNC: 28.3 PG
MCHC RBC-ENTMCNC: 31.8 GM/DL
MCV RBC AUTO: 89 FL
PLATELET # BLD AUTO: 259 K/UL
POTASSIUM SERPL-SCNC: 4.3 MMOL/L
PROT SERPL-MCNC: 7.4 G/DL
RBC # BLD: 5.19 M/UL
RBC # FLD: 12.8 %
SODIUM SERPL-SCNC: 143 MMOL/L
WBC # FLD AUTO: 7.16 K/UL

## 2024-03-20 ENCOUNTER — RESULT REVIEW (OUTPATIENT)
Age: 64
End: 2024-03-20

## 2024-03-25 ENCOUNTER — NON-APPOINTMENT (OUTPATIENT)
Age: 64
End: 2024-03-25

## 2024-03-25 DIAGNOSIS — K76.9 LIVER DISEASE, UNSPECIFIED: ICD-10-CM

## 2024-04-17 ENCOUNTER — RESULT REVIEW (OUTPATIENT)
Age: 64
End: 2024-04-17

## 2024-04-22 ENCOUNTER — RESULT REVIEW (OUTPATIENT)
Age: 64
End: 2024-04-22

## 2024-04-22 ENCOUNTER — APPOINTMENT (OUTPATIENT)
Dept: PULMONOLOGY | Facility: CLINIC | Age: 64
End: 2024-04-22

## 2024-04-22 ENCOUNTER — APPOINTMENT (OUTPATIENT)
Dept: HEMATOLOGY ONCOLOGY | Facility: CLINIC | Age: 64
End: 2024-04-22
Payer: MEDICARE

## 2024-04-22 VITALS
WEIGHT: 186 LBS | SYSTOLIC BLOOD PRESSURE: 123 MMHG | HEART RATE: 80 BPM | DIASTOLIC BLOOD PRESSURE: 82 MMHG | BODY MASS INDEX: 30.99 KG/M2 | TEMPERATURE: 98.2 F | RESPIRATION RATE: 18 BRPM | HEIGHT: 65 IN | OXYGEN SATURATION: 94 %

## 2024-04-22 DIAGNOSIS — R16.0 HEPATOMEGALY, NOT ELSEWHERE CLASSIFIED: ICD-10-CM

## 2024-04-22 PROCEDURE — 99213 OFFICE O/P EST LOW 20 MIN: CPT

## 2024-04-22 NOTE — HISTORY OF PRESENT ILLNESS
[0 - No Distress] : Distress Level: 0 [de-identified] : Mrs. Guerrero is a 64-year-old female who is referred by Dr. Joe Weaver for initial consultation for hematologic evaluation of elevated EBV VCA IgG.  She was following up with Dr. Weaver for routine visit and was found to be extremely fatigued.  Labs were drawn which revealed an EBV VCA IgG of 750.  She reports having recent infections with herpes zoster, diverticulitis and now EBV recurrence.  She has a history of psoriatic/ rheumatoid arthritis for which she received methotrexate for 15 years. ( stopped 2020  She also had an incidence of abscesses that were thought to be autoimmune related while in her 20s.  [de-identified] : She presents for follow up of hepatosplenomegaly and EBV.   She is being followed by hepatology for elevated LFTs and intermittent abdominal pain which improved when she discontinued fiber.  She was also found to have a an ill-defined 2.6 cm hepatic hypoechoic region adjacent to the patricia hepatis on ultrasound in the end of March.  She was referred for MRI of the liver.  She will also undergo FibroScan.  She will be receiving hepatitis B and A vaccines. She is scheduled for surgery on May 20 for endometriosis and polyps.

## 2024-04-22 NOTE — ASSESSMENT
[FreeTextEntry1] :  Mrs. Guerrero is a 64-year-old female who is referred by Dr. Joe Weaver for initial consultation for hematologic evaluation of elevated EBV VCA IgG.  She was following up with Dr. Weaver for routine visit and was found to be extremely fatigued.  Labs were drawn which revealed an EBV VCA IgG of 750.  She reports having recent infections with herpes zoster, diverticulitis and now EBV recurrence.  She has a history of psoriatic/ rheumatoid arthritis for which she received methotrexate for 15 years. ( stopped 2020  She also had an incidence of abscesses that were thought to be autoimmune related while in her 20s.  u?S 3/27/23 : nl spleen. Hepatromegaly/steatosis. Imptoved  Plan: > Pt is being evaluated for abd pain, elevated LFTs, and hypoechoic liver lesion, fatty liver.  MRI of abd, fibroscan pending. > Genetic testing negative > Reviewed available labs which were normal, including LFTs and hgb. > Gyn procedure for endometriosis and polyps pending. > colonoscoly --2018 > mammogram due 5/24 > Continue routine, age-appropriate, healthcare maintenance  > Office visit in 1 year or prn for new or worsening symptoms.

## 2024-05-21 ENCOUNTER — APPOINTMENT (OUTPATIENT)
Dept: PULMONOLOGY | Facility: CLINIC | Age: 64
End: 2024-05-21

## 2024-07-02 ENCOUNTER — APPOINTMENT (OUTPATIENT)
Age: 64
End: 2024-07-02
Payer: MEDICARE

## 2024-07-02 VITALS
HEART RATE: 83 BPM | OXYGEN SATURATION: 99 % | DIASTOLIC BLOOD PRESSURE: 80 MMHG | WEIGHT: 184 LBS | BODY MASS INDEX: 30.66 KG/M2 | HEIGHT: 65 IN | SYSTOLIC BLOOD PRESSURE: 120 MMHG

## 2024-07-02 DIAGNOSIS — L40.9 PSORIASIS, UNSPECIFIED: ICD-10-CM

## 2024-07-02 DIAGNOSIS — M17.0 BILATERAL PRIMARY OSTEOARTHRITIS OF KNEE: ICD-10-CM

## 2024-07-02 PROBLEM — L40.50 PSORIATIC ARTHRITIS: Status: ACTIVE | Noted: 2021-04-13

## 2024-07-02 PROCEDURE — 99204 OFFICE O/P NEW MOD 45 MIN: CPT

## 2024-07-02 RX ORDER — DICLOFENAC SODIUM 1% 10 MG/G
1 GEL TOPICAL
Qty: 2 | Refills: 2 | Status: ACTIVE | COMMUNITY
Start: 2024-07-02 | End: 1900-01-01

## 2024-07-17 ENCOUNTER — APPOINTMENT (OUTPATIENT)
Dept: GERIATRICS | Facility: CLINIC | Age: 64
End: 2024-07-17
Payer: MEDICARE

## 2024-07-17 VITALS
SYSTOLIC BLOOD PRESSURE: 142 MMHG | HEART RATE: 97 BPM | BODY MASS INDEX: 30.66 KG/M2 | WEIGHT: 184 LBS | HEIGHT: 65 IN | TEMPERATURE: 97.1 F | DIASTOLIC BLOOD PRESSURE: 90 MMHG | OXYGEN SATURATION: 97 %

## 2024-07-17 DIAGNOSIS — L40.50 ARTHROPATHIC PSORIASIS, UNSPECIFIED: ICD-10-CM

## 2024-07-17 DIAGNOSIS — F51.05 ANXIETY DISORDER, UNSPECIFIED: ICD-10-CM

## 2024-07-17 DIAGNOSIS — F41.9 ANXIETY DISORDER, UNSPECIFIED: ICD-10-CM

## 2024-07-17 PROCEDURE — 99213 OFFICE O/P EST LOW 20 MIN: CPT

## 2024-11-04 ENCOUNTER — APPOINTMENT (OUTPATIENT)
Dept: RHEUMATOLOGY | Facility: CLINIC | Age: 64
End: 2024-11-04
Payer: MEDICARE

## 2024-11-04 VITALS
DIASTOLIC BLOOD PRESSURE: 80 MMHG | BODY MASS INDEX: 29.32 KG/M2 | SYSTOLIC BLOOD PRESSURE: 120 MMHG | WEIGHT: 176 LBS | OXYGEN SATURATION: 97 % | HEART RATE: 68 BPM | HEIGHT: 65 IN

## 2024-11-04 DIAGNOSIS — L40.50 ARTHROPATHIC PSORIASIS, UNSPECIFIED: ICD-10-CM

## 2024-11-04 DIAGNOSIS — L40.9 PSORIASIS, UNSPECIFIED: ICD-10-CM

## 2024-11-04 PROCEDURE — 99214 OFFICE O/P EST MOD 30 MIN: CPT

## 2025-01-02 ENCOUNTER — TRANSCRIPTION ENCOUNTER (OUTPATIENT)
Age: 65
End: 2025-01-02

## 2025-03-25 ENCOUNTER — NON-APPOINTMENT (OUTPATIENT)
Age: 65
End: 2025-03-25

## 2025-03-25 ENCOUNTER — APPOINTMENT (OUTPATIENT)
Dept: HEMATOLOGY ONCOLOGY | Facility: CLINIC | Age: 65
End: 2025-03-25

## 2025-03-25 RX ORDER — LINACLOTIDE 145 UG/1
145 CAPSULE, GELATIN COATED ORAL
Refills: 0 | Status: ACTIVE | COMMUNITY
Start: 2025-03-25

## 2025-03-25 RX ORDER — LEVOCETIRIZINE DIHYDROCHLORIDE 5 MG/1
5 TABLET ORAL DAILY
Refills: 0 | Status: ACTIVE | COMMUNITY
Start: 2025-03-25

## 2025-04-02 ENCOUNTER — NON-APPOINTMENT (OUTPATIENT)
Age: 65
End: 2025-04-02

## 2025-04-04 ENCOUNTER — RESULT REVIEW (OUTPATIENT)
Age: 65
End: 2025-04-04

## 2025-04-04 ENCOUNTER — APPOINTMENT (OUTPATIENT)
Dept: HEMATOLOGY ONCOLOGY | Facility: CLINIC | Age: 65
End: 2025-04-04

## 2025-04-04 VITALS
BODY MASS INDEX: 30.66 KG/M2 | TEMPERATURE: 98.6 F | RESPIRATION RATE: 17 BRPM | HEART RATE: 75 BPM | SYSTOLIC BLOOD PRESSURE: 136 MMHG | WEIGHT: 184 LBS | HEIGHT: 65 IN | OXYGEN SATURATION: 96 % | DIASTOLIC BLOOD PRESSURE: 84 MMHG

## 2025-04-04 DIAGNOSIS — R76.8 OTHER SPECIFIED ABNORMAL IMMUNOLOGICAL FINDINGS IN SERUM: ICD-10-CM

## 2025-04-04 PROBLEM — R53.83 FATIGUE, UNSPECIFIED TYPE: Status: ACTIVE | Noted: 2025-04-04

## 2025-04-04 PROCEDURE — 99214 OFFICE O/P EST MOD 30 MIN: CPT

## 2025-04-11 DIAGNOSIS — K76.9 LIVER DISEASE, UNSPECIFIED: ICD-10-CM

## 2025-04-11 DIAGNOSIS — R79.89 OTHER SPECIFIED ABNORMAL FINDINGS OF BLOOD CHEMISTRY: ICD-10-CM

## 2025-04-11 DIAGNOSIS — R53.83 OTHER FATIGUE: ICD-10-CM

## 2025-04-11 DIAGNOSIS — R16.2 HEPATOMEGALY WITH SPLENOMEGALY, NOT ELSEWHERE CLASSIFIED: ICD-10-CM

## 2025-04-11 DIAGNOSIS — R16.1 SPLENOMEGALY, NOT ELSEWHERE CLASSIFIED: ICD-10-CM

## 2025-04-17 ENCOUNTER — RESULT REVIEW (OUTPATIENT)
Age: 65
End: 2025-04-17

## 2025-04-17 ENCOUNTER — APPOINTMENT (OUTPATIENT)
Dept: HEMATOLOGY ONCOLOGY | Facility: CLINIC | Age: 65
End: 2025-04-17
Payer: MEDICARE

## 2025-04-17 VITALS
DIASTOLIC BLOOD PRESSURE: 79 MMHG | HEART RATE: 77 BPM | RESPIRATION RATE: 17 BRPM | SYSTOLIC BLOOD PRESSURE: 120 MMHG | WEIGHT: 186.4 LBS | BODY MASS INDEX: 31.06 KG/M2 | OXYGEN SATURATION: 97 % | HEIGHT: 65 IN | TEMPERATURE: 98.1 F

## 2025-04-17 DIAGNOSIS — R16.0 HEPATOMEGALY, NOT ELSEWHERE CLASSIFIED: ICD-10-CM

## 2025-04-17 PROCEDURE — 99214 OFFICE O/P EST MOD 30 MIN: CPT

## 2025-05-12 ENCOUNTER — APPOINTMENT (OUTPATIENT)
Dept: RHEUMATOLOGY | Facility: CLINIC | Age: 65
End: 2025-05-12

## 2025-05-14 ENCOUNTER — APPOINTMENT (OUTPATIENT)
Age: 65
End: 2025-05-14
Payer: MEDICARE

## 2025-05-14 VITALS
BODY MASS INDEX: 32.25 KG/M2 | DIASTOLIC BLOOD PRESSURE: 68 MMHG | SYSTOLIC BLOOD PRESSURE: 118 MMHG | HEART RATE: 87 BPM | HEIGHT: 63.78 IN | OXYGEN SATURATION: 96 % | WEIGHT: 186.6 LBS

## 2025-05-14 DIAGNOSIS — R76.8 OTHER SPECIFIED ABNORMAL IMMUNOLOGICAL FINDINGS IN SERUM: ICD-10-CM

## 2025-05-14 DIAGNOSIS — E66.9 OBESITY, UNSPECIFIED: ICD-10-CM

## 2025-05-14 DIAGNOSIS — K76.0 FATTY (CHANGE OF) LIVER, NOT ELSEWHERE CLASSIFIED: ICD-10-CM

## 2025-05-14 DIAGNOSIS — E78.5 HYPERLIPIDEMIA, UNSPECIFIED: ICD-10-CM

## 2025-05-14 DIAGNOSIS — K21.9 GASTRO-ESOPHAGEAL REFLUX DISEASE W/OUT ESOPHAGITIS: ICD-10-CM

## 2025-05-14 PROCEDURE — 99205 OFFICE O/P NEW HI 60 MIN: CPT

## 2025-05-14 RX ORDER — SEMAGLUTIDE 0.25 MG/.5ML
0.25 INJECTION, SOLUTION SUBCUTANEOUS
Qty: 1 | Refills: 1 | Status: ACTIVE | COMMUNITY
Start: 2025-05-14 | End: 1900-01-01

## 2025-05-27 ENCOUNTER — APPOINTMENT (OUTPATIENT)
Dept: HEPATOLOGY | Facility: CLINIC | Age: 65
End: 2025-05-27
Payer: MEDICARE

## 2025-05-27 VITALS
SYSTOLIC BLOOD PRESSURE: 146 MMHG | HEIGHT: 64 IN | DIASTOLIC BLOOD PRESSURE: 86 MMHG | HEART RATE: 80 BPM | WEIGHT: 180 LBS | BODY MASS INDEX: 30.73 KG/M2

## 2025-05-27 DIAGNOSIS — K76.9 LIVER DISEASE, UNSPECIFIED: ICD-10-CM

## 2025-05-27 DIAGNOSIS — R79.89 OTHER SPECIFIED ABNORMAL FINDINGS OF BLOOD CHEMISTRY: ICD-10-CM

## 2025-05-27 DIAGNOSIS — K76.0 FATTY (CHANGE OF) LIVER, NOT ELSEWHERE CLASSIFIED: ICD-10-CM

## 2025-05-27 DIAGNOSIS — R16.2 HEPATOMEGALY WITH SPLENOMEGALY, NOT ELSEWHERE CLASSIFIED: ICD-10-CM

## 2025-05-27 PROCEDURE — 99204 OFFICE O/P NEW MOD 45 MIN: CPT

## 2025-05-29 ENCOUNTER — APPOINTMENT (OUTPATIENT)
Dept: RHEUMATOLOGY | Facility: CLINIC | Age: 65
End: 2025-05-29

## 2025-05-31 ENCOUNTER — RESULT REVIEW (OUTPATIENT)
Age: 65
End: 2025-05-31

## 2025-06-02 ENCOUNTER — APPOINTMENT (OUTPATIENT)
Age: 65
End: 2025-06-02
Payer: MEDICARE

## 2025-06-02 ENCOUNTER — APPOINTMENT (OUTPATIENT)
Dept: BARIATRICS/WEIGHT MGMT | Facility: CLINIC | Age: 65
End: 2025-06-02

## 2025-06-02 VITALS — SYSTOLIC BLOOD PRESSURE: 108 MMHG | BODY MASS INDEX: 31.07 KG/M2 | DIASTOLIC BLOOD PRESSURE: 64 MMHG | WEIGHT: 181 LBS

## 2025-06-02 DIAGNOSIS — E66.9 OBESITY, UNSPECIFIED: ICD-10-CM

## 2025-06-02 PROCEDURE — 99213 OFFICE O/P EST LOW 20 MIN: CPT

## 2025-06-05 ENCOUNTER — NON-APPOINTMENT (OUTPATIENT)
Age: 65
End: 2025-06-05

## 2025-06-06 ENCOUNTER — NON-APPOINTMENT (OUTPATIENT)
Age: 65
End: 2025-06-06

## 2025-07-21 ENCOUNTER — APPOINTMENT (OUTPATIENT)
Age: 65
End: 2025-07-21
Payer: MEDICARE

## 2025-07-21 VITALS — SYSTOLIC BLOOD PRESSURE: 120 MMHG | DIASTOLIC BLOOD PRESSURE: 74 MMHG | WEIGHT: 174.8 LBS | BODY MASS INDEX: 30 KG/M2

## 2025-07-21 DIAGNOSIS — E66.9 OBESITY, UNSPECIFIED: ICD-10-CM

## 2025-07-21 DIAGNOSIS — I10 ESSENTIAL (PRIMARY) HYPERTENSION: ICD-10-CM

## 2025-07-21 PROCEDURE — 99214 OFFICE O/P EST MOD 30 MIN: CPT

## 2025-07-23 ENCOUNTER — APPOINTMENT (OUTPATIENT)
Dept: GERIATRICS | Facility: CLINIC | Age: 65
End: 2025-07-23
Payer: MEDICARE

## 2025-07-23 VITALS
BODY MASS INDEX: 30.55 KG/M2 | OXYGEN SATURATION: 98 % | SYSTOLIC BLOOD PRESSURE: 114 MMHG | TEMPERATURE: 98.2 F | HEART RATE: 67 BPM | WEIGHT: 178 LBS | DIASTOLIC BLOOD PRESSURE: 80 MMHG

## 2025-07-23 DIAGNOSIS — L40.50 ARTHROPATHIC PSORIASIS, UNSPECIFIED: ICD-10-CM

## 2025-07-23 DIAGNOSIS — F41.9 ANXIETY DISORDER, UNSPECIFIED: ICD-10-CM

## 2025-07-23 PROCEDURE — G2211 COMPLEX E/M VISIT ADD ON: CPT

## 2025-07-23 PROCEDURE — 99213 OFFICE O/P EST LOW 20 MIN: CPT

## 2025-09-02 ENCOUNTER — NON-APPOINTMENT (OUTPATIENT)
Age: 65
End: 2025-09-02

## 2025-09-02 RX ORDER — TIRZEPATIDE 2.5 MG/.5ML
2.5 INJECTION, SOLUTION SUBCUTANEOUS
Qty: 1 | Refills: 0 | Status: ACTIVE | COMMUNITY
Start: 2025-09-02 | End: 1900-01-01

## 2025-09-16 ENCOUNTER — APPOINTMENT (OUTPATIENT)
Age: 65
End: 2025-09-16
Payer: MEDICARE

## 2025-09-16 VITALS — BODY MASS INDEX: 29.25 KG/M2 | DIASTOLIC BLOOD PRESSURE: 78 MMHG | WEIGHT: 170.4 LBS | SYSTOLIC BLOOD PRESSURE: 116 MMHG

## 2025-09-16 DIAGNOSIS — E66.9 OBESITY, UNSPECIFIED: ICD-10-CM

## 2025-09-16 DIAGNOSIS — I10 ESSENTIAL (PRIMARY) HYPERTENSION: ICD-10-CM

## 2025-09-16 PROCEDURE — 99214 OFFICE O/P EST MOD 30 MIN: CPT
